# Patient Record
Sex: MALE | Race: WHITE | NOT HISPANIC OR LATINO | Employment: FULL TIME | URBAN - METROPOLITAN AREA
[De-identification: names, ages, dates, MRNs, and addresses within clinical notes are randomized per-mention and may not be internally consistent; named-entity substitution may affect disease eponyms.]

---

## 2017-12-18 ENCOUNTER — OFFICE VISIT (OUTPATIENT)
Dept: URGENT CARE | Facility: CLINIC | Age: 42
End: 2017-12-18
Payer: COMMERCIAL

## 2017-12-18 VITALS
OXYGEN SATURATION: 99 % | DIASTOLIC BLOOD PRESSURE: 88 MMHG | RESPIRATION RATE: 14 BRPM | HEART RATE: 78 BPM | TEMPERATURE: 98 F | SYSTOLIC BLOOD PRESSURE: 133 MMHG

## 2017-12-18 DIAGNOSIS — M46.1 SACROILIITIS: Primary | ICD-10-CM

## 2017-12-18 DIAGNOSIS — M62.830 BACK MUSCLE SPASM: ICD-10-CM

## 2017-12-18 PROCEDURE — 96372 THER/PROPH/DIAG INJ SC/IM: CPT | Mod: S$GLB,,, | Performed by: EMERGENCY MEDICINE

## 2017-12-18 PROCEDURE — 99203 OFFICE O/P NEW LOW 30 MIN: CPT | Mod: 25,S$GLB,, | Performed by: EMERGENCY MEDICINE

## 2017-12-18 RX ORDER — BETAMETHASONE SODIUM PHOSPHATE AND BETAMETHASONE ACETATE 3; 3 MG/ML; MG/ML
9 INJECTION, SUSPENSION INTRA-ARTICULAR; INTRALESIONAL; INTRAMUSCULAR; SOFT TISSUE
Status: COMPLETED | OUTPATIENT
Start: 2017-12-18 | End: 2017-12-18

## 2017-12-18 RX ORDER — CYCLOBENZAPRINE HCL 10 MG
10 TABLET ORAL 3 TIMES DAILY PRN
Qty: 30 TABLET | Refills: 1 | Status: SHIPPED | OUTPATIENT
Start: 2017-12-18 | End: 2017-12-28

## 2017-12-18 RX ADMIN — BETAMETHASONE SODIUM PHOSPHATE AND BETAMETHASONE ACETATE 9 MG: 3; 3 INJECTION, SUSPENSION INTRA-ARTICULAR; INTRALESIONAL; INTRAMUSCULAR; SOFT TISSUE at 11:12

## 2017-12-18 NOTE — PROGRESS NOTES
Subjective:       Patient ID: Kishore Silverio is a 42 y.o. male.    Vitals:    12/18/17 1100   BP: 133/88   Pulse: 78   Resp: 14   Temp: 97.9 °F (36.6 °C)       Chief Complaint: Back Pain (4 DAYS )    STATES HAS BEEN TRYING TO GET BACK INTO SHAPE AND RUNNING AND LIFTED SEVERAL TILE BOXES. SINCE HAS HAD RIGHT LOWER BACK PAIN AND STIFFNESS. THE ONLY THING THAT SEEMS TO BE WORKING  MG IBUPROFEN AT NIGHT WITH FLEXERIL AT NIGHT. NO RADIATION OF PAIN, NO WEAKNESS. OTHERWISE NO RASH, NO TRAUMA, NO URINARY COMPLAINTS.      Back Pain   This is a new problem. The current episode started in the past 7 days. The problem occurs constantly. The problem has been gradually worsening since onset. Pain location: LOWER lt  The quality of the pain is described as aching. The pain does not radiate. The pain is at a severity of 5/10. The pain is moderate. The pain is the same all the time. The symptoms are aggravated by lying down and bending. Stiffness is present in the morning. Pertinent negatives include no abdominal pain, chest pain, fever or headaches. He has tried heat (medrol dose pack ) for the symptoms. The treatment provided no relief.     Review of Systems   Constitution: Negative for chills and fever.   HENT: Negative for sore throat.    Eyes: Negative for blurred vision.   Cardiovascular: Negative for chest pain.   Respiratory: Negative for shortness of breath.    Skin: Negative for rash.   Musculoskeletal: Positive for back pain, muscle cramps, muscle weakness and stiffness. Negative for joint pain.   Gastrointestinal: Negative for abdominal pain, diarrhea, nausea and vomiting.   Neurological: Negative for headaches.   Psychiatric/Behavioral: The patient is not nervous/anxious.        Objective:      Physical Exam   Constitutional: He is oriented to person, place, and time. He appears well-developed and well-nourished. No distress.   HENT:   Head: Normocephalic and atraumatic.   Right Ear: External ear normal.   Left  Ear: External ear normal.   Mouth/Throat: Oropharynx is clear and moist. No oropharyngeal exudate.   Eyes: Conjunctivae and EOM are normal. Pupils are equal, round, and reactive to light.   Neck: Normal range of motion. Neck supple.   Cardiovascular: Normal rate, regular rhythm and normal heart sounds.  Exam reveals no gallop and no friction rub.    No murmur heard.  Pulmonary/Chest: Breath sounds normal. No respiratory distress. He has no wheezes. He has no rales. He exhibits no tenderness.   Abdominal: Soft. Bowel sounds are normal.   Musculoskeletal: Normal range of motion. He exhibits tenderness (TTP OVER THE RIGHT SI JOINT AREA).   Neurological: He is alert and oriented to person, place, and time. He has normal reflexes.   Skin: Skin is warm and dry. Capillary refill takes less than 2 seconds. No rash noted. He is not diaphoretic. No pallor.   Psychiatric: He has a normal mood and affect. His behavior is normal.   Nursing note and vitals reviewed.      Assessment:       1. Sacroiliitis    2. Back muscle spasm        Plan:       Kishore was seen today for back pain.    Diagnoses and all orders for this visit:    Sacroiliitis    Back muscle spasm    Other orders  -     betamethasone acetate-betamethasone sodium phosphate injection 9 mg; Inject 1.5 mLs (9 mg total) into the muscle one time.  -     cyclobenzaprine (FLEXERIL) 10 MG tablet; Take 1 tablet (10 mg total) by mouth 3 (three) times daily as needed for Muscle spasms.          Patient Instructions     CELETONE 1.5 CC GIVEN IN CLINIC  FLEXERIL RX FOR MUSCLE SPASM  SEE MUSCLE SPASM SHEET  SEE BACK PAIN SHEET    RETURN FOR ANY CONCERNS OR PROBLEMS  NO HEAVY LIFTING  RETURN FOR ANY CONCERNS OR PROBLEMS  Causes of Lumbar (Low Back) Pain  Low back pain can be caused by problems with any part of the lumbar spine. A disk can herniate (push out) and press on a nerve. Vertebrae can rub against each other or slip out of place. This can irritate facet joints and nerves. It  can also lead to stenosis, a narrowing of the spinal canal or foramen.  Pressure from a disk  Constant wear and tear on a disk can cause it to weaken and push outward. Part of the disk may then press on nearby nerves. There are two common types of herniated disks:  Contained means the soft nucleus is protruding outward.   Extruded means the firm annulus has torn, letting the soft center squeeze through.     Pressure from bone  An unstable spine   With age, a disk may thin and wear out. Vertebrae above and below the disk may begin to touch. This can put pressure on nerves. It can also cause bone spurs (growths) to form where the bones rub together.    Stenosis results when bone spurs narrow the foramen or spinal canal. This also puts pressure on nerves. Slipping vertebrae can irritate nerves and joints. They can also worsen stenosis.    In some cases, vertebrae become unstable and slip forward. This is called spondylolisthesis.     Date Last Reviewed: 10/12/2015  © 9212-1115 [employer]. [address] [place] [address]. All rights reserved. This information is not intended as a substitute for professional medical care. Always follow your healthcare professional's instructions.        Muscle Spasm  A muscle spasm (also called a cramp) is an involuntary muscle contraction. The muscle tightens quickly and strongly. A hard lump may form in the muscle. Muscle spasms are very painful. Read on to learn more about muscle spasms and how to treat and prevent them.    What causes muscles to spasm?  Often, the cause of a muscle spasm is not known. Muscle spasm is due to irritation of muscle fibers. Some things can make a muscle spasm more likely. These include:  · Injury  · Heavy exercise  · Overtired muscles  · A muscle held in one position for a long time  · Dehydration  · Low levels of certain minerals in the body  · Taking certain medications, such as diuretics or water pills  · Certain medical  conditions, such as kidney failure or diabetes  · Being pregnant  Stopping a muscle spasm  Muscle spasms often come and go quickly. When a muscle goes into spasm, very gently stretch and massage the muscle. This may help calm the muscle fibers. Then rest the muscle.  Preventing muscle spasms  Although there is little or no evidence that staying hydrated, taking certain vitamins or minerals or stretching works to prevent cramps, these measures may help and have other benefits. Talk to your health care provider about steps to take to avoid muscle spasms. These may include:  · Drinking enough fluids to avoid dehydration, especially when you exercise.  · Taking vitamin or mineral supplements.  · Getting regular exercise.  · Stretching regularly, especially before exercise.  · Limit caffeine and smoking.  · Taking a prescription muscle relaxant.  When to call your doctor  Call your doctor if you have any of the following:  · Severe cramping  · Cramping that lasts a long time, does not go away with stretching, or keeps coming back  · Pain, tingling, or weakness in the arms or legs  · Pain that wakes you up at night   Date Last Reviewed: 9/1/2015  © 3505-4795 [employer]. [address] [place] [address]. All rights reserved. This information is not intended as a substitute for professional medical care. Always follow your healthcare professional's instructions.

## 2017-12-18 NOTE — PATIENT INSTRUCTIONS
CELETONE 1.5 CC GIVEN IN CLINIC  FLEXERIL RX FOR MUSCLE SPASM  SEE MUSCLE SPASM SHEET  SEE BACK PAIN SHEET    RETURN FOR ANY CONCERNS OR PROBLEMS  NO HEAVY LIFTING  RETURN FOR ANY CONCERNS OR PROBLEMS  Causes of Lumbar (Low Back) Pain  Low back pain can be caused by problems with any part of the lumbar spine. A disk can herniate (push out) and press on a nerve. Vertebrae can rub against each other or slip out of place. This can irritate facet joints and nerves. It can also lead to stenosis, a narrowing of the spinal canal or foramen.  Pressure from a disk  Constant wear and tear on a disk can cause it to weaken and push outward. Part of the disk may then press on nearby nerves. There are two common types of herniated disks:  Contained means the soft nucleus is protruding outward.   Extruded means the firm annulus has torn, letting the soft center squeeze through.     Pressure from bone  An unstable spine   With age, a disk may thin and wear out. Vertebrae above and below the disk may begin to touch. This can put pressure on nerves. It can also cause bone spurs (growths) to form where the bones rub together.    Stenosis results when bone spurs narrow the foramen or spinal canal. This also puts pressure on nerves. Slipping vertebrae can irritate nerves and joints. They can also worsen stenosis.    In some cases, vertebrae become unstable and slip forward. This is called spondylolisthesis.     Date Last Reviewed: 10/12/2015  © 8066-1575 [employer]. [address] [place] [address], [place] [address]. All rights reserved. This information is not intended as a substitute for professional medical care. Always follow your healthcare professional's instructions.        Muscle Spasm  A muscle spasm (also called a cramp) is an involuntary muscle contraction. The muscle tightens quickly and strongly. A hard lump may form in the muscle. Muscle spasms are very painful. Read on to learn more about muscle spasms and how to  treat and prevent them.    What causes muscles to spasm?  Often, the cause of a muscle spasm is not known. Muscle spasm is due to irritation of muscle fibers. Some things can make a muscle spasm more likely. These include:  · Injury  · Heavy exercise  · Overtired muscles  · A muscle held in one position for a long time  · Dehydration  · Low levels of certain minerals in the body  · Taking certain medications, such as diuretics or water pills  · Certain medical conditions, such as kidney failure or diabetes  · Being pregnant  Stopping a muscle spasm  Muscle spasms often come and go quickly. When a muscle goes into spasm, very gently stretch and massage the muscle. This may help calm the muscle fibers. Then rest the muscle.  Preventing muscle spasms  Although there is little or no evidence that staying hydrated, taking certain vitamins or minerals or stretching works to prevent cramps, these measures may help and have other benefits. Talk to your health care provider about steps to take to avoid muscle spasms. These may include:  · Drinking enough fluids to avoid dehydration, especially when you exercise.  · Taking vitamin or mineral supplements.  · Getting regular exercise.  · Stretching regularly, especially before exercise.  · Limit caffeine and smoking.  · Taking a prescription muscle relaxant.  When to call your doctor  Call your doctor if you have any of the following:  · Severe cramping  · Cramping that lasts a long time, does not go away with stretching, or keeps coming back  · Pain, tingling, or weakness in the arms or legs  · Pain that wakes you up at night   Date Last Reviewed: 9/1/2015  © 3844-2262 [employer]. [address] [place] [address], [place] [address]. All rights reserved. This information is not intended as a substitute for professional medical care. Always follow your healthcare professional's instructions.

## 2019-01-11 ENCOUNTER — OFFICE VISIT (OUTPATIENT)
Dept: URGENT CARE | Facility: CLINIC | Age: 44
End: 2019-01-11
Payer: COMMERCIAL

## 2019-01-11 VITALS
HEIGHT: 70 IN | HEART RATE: 86 BPM | OXYGEN SATURATION: 98 % | RESPIRATION RATE: 16 BRPM | WEIGHT: 215 LBS | DIASTOLIC BLOOD PRESSURE: 91 MMHG | TEMPERATURE: 98 F | SYSTOLIC BLOOD PRESSURE: 146 MMHG | BODY MASS INDEX: 30.78 KG/M2

## 2019-01-11 DIAGNOSIS — M54.50 LOW BACK PAIN WITHOUT SCIATICA, UNSPECIFIED BACK PAIN LATERALITY, UNSPECIFIED CHRONICITY: Primary | ICD-10-CM

## 2019-01-11 PROCEDURE — 96372 PR INJECTION,THERAP/PROPH/DIAG2ST, IM OR SUBCUT: ICD-10-PCS | Mod: S$GLB,,, | Performed by: NURSE PRACTITIONER

## 2019-01-11 PROCEDURE — 96372 THER/PROPH/DIAG INJ SC/IM: CPT | Mod: S$GLB,,, | Performed by: NURSE PRACTITIONER

## 2019-01-11 PROCEDURE — 3008F BODY MASS INDEX DOCD: CPT | Mod: CPTII,S$GLB,, | Performed by: NURSE PRACTITIONER

## 2019-01-11 PROCEDURE — 99214 PR OFFICE/OUTPT VISIT, EST, LEVL IV, 30-39 MIN: ICD-10-PCS | Mod: 25,S$GLB,, | Performed by: NURSE PRACTITIONER

## 2019-01-11 PROCEDURE — 99214 OFFICE O/P EST MOD 30 MIN: CPT | Mod: 25,S$GLB,, | Performed by: NURSE PRACTITIONER

## 2019-01-11 PROCEDURE — 3008F PR BODY MASS INDEX (BMI) DOCUMENTED: ICD-10-PCS | Mod: CPTII,S$GLB,, | Performed by: NURSE PRACTITIONER

## 2019-01-11 RX ORDER — NAPROXEN 500 MG/1
500 TABLET ORAL 2 TIMES DAILY WITH MEALS
Qty: 20 TABLET | Refills: 0 | Status: SHIPPED | OUTPATIENT
Start: 2019-01-11 | End: 2019-01-21

## 2019-01-11 RX ORDER — BETAMETHASONE SODIUM PHOSPHATE AND BETAMETHASONE ACETATE 3; 3 MG/ML; MG/ML
9 INJECTION, SUSPENSION INTRA-ARTICULAR; INTRALESIONAL; INTRAMUSCULAR; SOFT TISSUE
Status: COMPLETED | OUTPATIENT
Start: 2019-01-11 | End: 2019-01-11

## 2019-01-11 RX ORDER — TRAMADOL HYDROCHLORIDE 50 MG/1
50 TABLET ORAL EVERY 8 HOURS PRN
Qty: 21 TABLET | Refills: 0 | Status: SHIPPED | OUTPATIENT
Start: 2019-01-11 | End: 2019-01-18

## 2019-01-11 RX ORDER — METHOCARBAMOL 500 MG/1
500 TABLET, FILM COATED ORAL 4 TIMES DAILY
Qty: 30 TABLET | Refills: 0 | Status: SHIPPED | OUTPATIENT
Start: 2019-01-11 | End: 2019-01-21

## 2019-01-11 RX ADMIN — BETAMETHASONE SODIUM PHOSPHATE AND BETAMETHASONE ACETATE 9 MG: 3; 3 INJECTION, SUSPENSION INTRA-ARTICULAR; INTRALESIONAL; INTRAMUSCULAR; SOFT TISSUE at 09:01

## 2019-01-11 NOTE — PATIENT INSTRUCTIONS
Please drink plenty of fluids.  Please get plenty of rest.  Please return here or go to the Emergency Department for any concerns or worsening of condition.  If you were prescribed a narcotic medication, do not drive or operate heavy equipment or machinery while taking these medications.  If you were not prescribed an anti-inflammatory medication, and if you do not have any history of stomach/intestinal ulcers, or kidney disease, or are not taking a blood thinner such as Coumadin, Plavix, Pradaxa, Eloquis, or Xaralta for example, it is OK to take over the counter Ibuprofen or Advil or Motrin or Aleve as directed.  Do not take these medications on an empty stomach.  If you lose control of your bowel and/or bladder, please go to the nearest Emergency Department immediately.  If you lose sensation in between your legs by your genitalia and/or rectum, please go to the nearest Emergency Department immediately.  If you lose control or sensation of any extremity, please go to the nearest Emergency Department immediately.  Please follow up with your primary care doctor or specialist as needed.    If you  smoke, please stop smoking.  Back Pain (Acute or Chronic)    Back pain is one of the most common problems. The good news is that most people feel better in 1 to 2 weeks, and most of the rest in 1 to 2 months. Most people can remain active.  People experience and describe pain differently; not everyone is the same.  · The pain can be sharp, stabbing, shooting, aching, cramping or burning.  · Movement, standing, bending, lifting, sitting, or walking may worsen pain.  · It can be localized to one spot or area, or it can be more generalized.  · It can spread or radiate upwards, to the front, or go down your arms or legs (sciatica).  · It can cause muscle spasm.  Most of the time, mechanical problems with the muscles or spine cause the pain. Mechanical problems are usually caused by an injury to the muscles or ligaments. While  illness can cause back pain, it is usually not caused by a serious illness. Mechanical problems include:   · Physical activity such as sports, exercise, work, or normal activity  · Overexertion, lifting, pushing, pulling incorrectly or too aggressively  · Sudden twisting, bending, or stretching from an accident, or accidental movement  · Poor posture  · Stretching or moving wrong, without noticing pain at the time  · Poor coordination, lack of regular exercise (check with your doctor about this)  · Spinal disc disease or arthritis  · Stress  Pain can also be related to pregnancy, or illness like appendicitis, bladder or kidney infections, pelvic infections, and many other things.  Acute back pain usually gets better in 1 to 2 weeks. Back pain related to disk disease, arthritis in the spinal joints or spinal stenosis (narrowing of the spinal canal) can become chronic and last for months or years.  Unless you had a physical injury (for example, a car accident or fall) X-rays are usually not needed for the initial evaluation of back pain. If pain continues and does not respond to medical treatment, X-rays and other tests may be needed.  Home care  Try these home care recommendations:  · When in bed, try to find a position of comfort. A firm mattress is best. Try lying flat on your back with pillows under your knees. You can also try lying on your side with your knees bent up towards your chest and a pillow between your knees.  · At first, do not try to stretch out the sore spots. If there is a strain, it is not like the good soreness you get after exercising without an injury. In this case, stretching may make it worse.  · Avoid prolong sitting, long car rides, or travel. This puts more stress on the lower back than standing or walking.  · During the first 24 to 72 hours after an acute injury or flare up of chronic back pain, apply an ice pack to the painful area for 20 minutes and then remove it for 20 minutes. Do  this over a period of 60 to 90 minutes or several times a day. This will reduce swelling and pain. Wrap the ice pack in a thin towel or plastic to protect your skin.  · You can start with ice, then switch to heat. Heat (hot shower, hot bath, or heating pad) reduces pain and works well for muscle spasms. Heat can be applied to the painful area for 20 minutes then remove it for 20 minutes. Do this over a period of 60 to 90 minutes or several times a day. Do not sleep on a heating pad. It can lead to skin burns or tissue damage.  · You can alternate ice and heat therapy. Talk with your doctor about the best treatment for your back pain.  · Therapeutic massage can help relax the back muscles without stretching them.  · Be aware of safe lifting methods and do not lift anything without stretching first.  Medicines  Talk to your doctor before using medicine, especially if you have other medical problems or are taking other medicines.  · You may use over-the-counter medicine as directed on the bottle to control pain, unless another pain medicine was prescribed. If you have chronic conditions like diabetes, liver or kidney disease, stomach ulcers, or gastrointestinal bleeding, or are taking blood thinners, talk to your doctor before taking any medicine.  · Be careful if you are given a prescription medicines, narcotics, or medicine for muscle spasms. They can cause drowsiness, affect your coordination, reflexes, and judgement. Do not drive or operate heavy machinery.  Follow-up care  Follow up with your healthcare provider, or as advised.   A radiologist will review any X-rays that were taken. Your provide will notify you of any new findings that may affect your care.  Call 911  Call emergency services if any of the following occur:  · Trouble breathing  · Confusion  · Very drowsy or trouble awakening  · Fainting or loss of consciousness  · Rapid or very slow heart rate  · Loss of bowel or bladder control  When to seek  medical advice  Call your healthcare provider right away if any of these occur:   · Pain becomes worse or spreads to your legs  · Weakness or numbness in one or both legs  · Numbness in the groin or genital area  Date Last Reviewed: 7/1/2016  © 0793-3408 [employer]. [address] [place] [address]. All rights reserved. This information is not intended as a substitute for professional medical care. Always follow your healthcare professional's instructions.

## 2019-07-24 ENCOUNTER — OFFICE VISIT (OUTPATIENT)
Dept: URGENT CARE | Facility: CLINIC | Age: 44
End: 2019-07-24
Payer: COMMERCIAL

## 2019-07-24 VITALS
HEART RATE: 71 BPM | BODY MASS INDEX: 30.06 KG/M2 | WEIGHT: 210 LBS | RESPIRATION RATE: 18 BRPM | SYSTOLIC BLOOD PRESSURE: 128 MMHG | TEMPERATURE: 98 F | HEIGHT: 70 IN | OXYGEN SATURATION: 99 % | DIASTOLIC BLOOD PRESSURE: 85 MMHG

## 2019-07-24 DIAGNOSIS — J40 BRONCHITIS: Primary | ICD-10-CM

## 2019-07-24 PROCEDURE — 99214 OFFICE O/P EST MOD 30 MIN: CPT | Mod: 25,S$GLB,, | Performed by: FAMILY MEDICINE

## 2019-07-24 PROCEDURE — 94640 PR INHAL RX, AIRWAY OBST/DX SPUTUM INDUCT: ICD-10-PCS | Mod: S$GLB,,, | Performed by: FAMILY MEDICINE

## 2019-07-24 PROCEDURE — 3008F PR BODY MASS INDEX (BMI) DOCUMENTED: ICD-10-PCS | Mod: CPTII,S$GLB,, | Performed by: FAMILY MEDICINE

## 2019-07-24 PROCEDURE — 99214 PR OFFICE/OUTPT VISIT, EST, LEVL IV, 30-39 MIN: ICD-10-PCS | Mod: 25,S$GLB,, | Performed by: FAMILY MEDICINE

## 2019-07-24 PROCEDURE — 3008F BODY MASS INDEX DOCD: CPT | Mod: CPTII,S$GLB,, | Performed by: FAMILY MEDICINE

## 2019-07-24 PROCEDURE — 94640 AIRWAY INHALATION TREATMENT: CPT | Mod: S$GLB,,, | Performed by: FAMILY MEDICINE

## 2019-07-24 RX ORDER — PREDNISONE 20 MG/1
40 TABLET ORAL DAILY
Qty: 6 TABLET | Refills: 0 | Status: SHIPPED | OUTPATIENT
Start: 2019-07-24 | End: 2019-07-27

## 2019-07-24 RX ORDER — BENZONATATE 200 MG/1
200 CAPSULE ORAL 3 TIMES DAILY PRN
Qty: 30 CAPSULE | Refills: 0 | Status: SHIPPED | OUTPATIENT
Start: 2019-07-24 | End: 2024-03-04 | Stop reason: ALTCHOICE

## 2019-07-24 RX ORDER — ALBUTEROL SULFATE 90 UG/1
2 AEROSOL, METERED RESPIRATORY (INHALATION) EVERY 6 HOURS PRN
Qty: 1 INHALER | Refills: 0 | Status: SHIPPED | OUTPATIENT
Start: 2019-07-24

## 2019-07-24 RX ORDER — ALBUTEROL SULFATE 0.83 MG/ML
2.5 SOLUTION RESPIRATORY (INHALATION)
Status: COMPLETED | OUTPATIENT
Start: 2019-07-24 | End: 2019-07-24

## 2019-07-24 RX ADMIN — ALBUTEROL SULFATE 2.5 MG: 0.83 SOLUTION RESPIRATORY (INHALATION) at 02:07

## 2019-07-24 NOTE — PATIENT INSTRUCTIONS
PLEASE READ YOUR DISCHARGE INSTRUCTIONS ENTIRELY AS IT CONTAINS IMPORTANT INFORMATION.      Please take your steroids to completion.   You received a steroid today - this can elevate your blood pressure, elevate your blood sugar, water weight gain, nervous energy, redness to the face and dimpling of the skin where the shot goes in if you had an injection.   Do not use steroids more than 3 times per year.   If you have diabetes, please check you blood sugar frequently.  If you have high blood pressure, please check your blood pressure frequently.     Use the albuterol inhaler for wheezing.     Tessalon for cough    Please return or see your primary care doctor if you develop new or worsening symptoms.     Please arrange follow up with your primary medical clinic as soon as possible. You must understand that you've received an Urgent Care treatment only and that you may be released before all of your medical problems are known or treated. You, the patient, will arrange for follow up as instructed. If your symptoms worsen or fail to improve you should go to the Emergency Room.    Bronchitis with Wheezing (Viral or Bacterial: Adult)    Bronchitis is an infection of the air passages. It often occurs during a cold and is usually caused by a virus. Symptoms include cough with mucus (phlegm) and low-grade fever. This illness is contagious during the first few days and is spread through the air by coughing and sneezing, or by direct contact (touching the sick person and then touching your own eyes, nose, or mouth).  If there is a lot of inflammation, air flow is restricted. The air passages may also go into spasm, especially if you have asthma. This causes wheezing and difficulty breathing even in people who do not have asthma.  Bronchitis usually lasts 7 to 14 days. The wheezing should improve with treatment during the first week. An inhaler is often prescribed to relax the air passages and stop wheezing. Antibiotics will  be prescribed if your doctor thinks there is also a secondary bacterial infection.  Home care  · If symptoms are severe, rest at home for the first 2 to 3 days. When you go back to your usual activities, don't let yourself get too tired.  · Do not smoke. Also avoid being exposed to secondhand smoke.  · You may use over-the-counter medicine to control fever or pain, unless another medicine was prescribed. Note: If you have chronic liver or kidney disease or have ever had a stomach ulcer or gastrointestinal bleeding, talk with your healthcare provider before using these medicines. Also talk to your provider if you are taking medicine to prevent blood clots.) Aspirin should never be given to anyone younger than 18 years of age who is ill with a viral infection or fever. It may cause severe liver or brain damage.  · Your appetite may be poor, so a light diet is fine. Avoid dehydration by drinking 6 to 8 glasses of fluids per day (such as water, soft drinks, sports drinks, juices, tea, or soup). Extra fluids will help loosen secretions in the nose and lungs.  · Over-the-counter cough, cold, and sore-throat medicines will not shorten the length of the illness, but they may be helpful to reduce symptoms. (Note: Do not use decongestants if you have high blood pressure.)  · If you were given an inhaler, use it exactly as directed. If you need to use it more often than prescribed, your condition may be worsening. If this happens, contact your healthcare provider.  · If prescribed, finish all antibiotic medicine, even if you are feeling better after only a few days.  Follow-up care  Follow up with your healthcare provider, or as advised. If you had an X-ray or ECG (electrocardiogram), a specialist will review it. You will be notified of any new findings that may affect your care.  Note: If you are age 65 or older, or if you have a chronic lung disease or condition that affects your immune system, or you smoke, talk to your  healthcare provider about having a pneumococcal vaccinations and a yearly influenza vaccination (flu shot).  When to seek medical advice  Call your healthcare provider right away if any of these occur:  · Fever of 100.4°F (38°C) or higher  · Coughing up increasing amounts of colored sputum  · Weakness, drowsiness, headache, facial pain, ear pain, or a stiff neck  Call 911, or get immediate medical care  Contact emergency services right away if any of these occur.  · Coughing up blood  · Worsening weakness, drowsiness, headache, or stiff neck  · Increased wheezing not helped with medication, shortness of breath, or pain with breathing  Date Last Reviewed: 9/13/2015  © 5787-9858 [employer]. [address] [place] [address], [place] [address]. All rights reserved. This information is not intended as a substitute for professional medical care. Always follow your healthcare professional's instructions.

## 2019-07-24 NOTE — PROGRESS NOTES
"Subjective:       Patient ID: Kishore Silverio is a 43 y.o. male.    Vitals:    07/24/19 1402 07/24/19 1422   BP: 128/85    Pulse: 80 71   Resp: 18    Temp: 98 °F (36.7 °C)    SpO2: 99% 99%   Weight: 95.3 kg (210 lb)    Height: 5' 10" (1.778 m)        Chief Complaint: Cough (2 weeks now )    Patient states cough for 2 weeks.  No fever currently but did have fever at the onset of symptoms.  Does feel short of breath and sometimes hear wheezing.  No history of bronchitis or pneumonia.  Has been taking Mucinex DM    Cough   This is a new problem. The current episode started 1 to 4 weeks ago. The problem has been gradually worsening. The problem occurs every few minutes. The cough is productive of sputum (clear ). Associated symptoms include shortness of breath and wheezing. Pertinent negatives include no chest pain, chills, ear pain, eye redness, fever, headaches, myalgias or sore throat. Nothing aggravates the symptoms. He has tried OTC cough suppressant for the symptoms. The treatment provided mild relief. There is no history of asthma, bronchitis or pneumonia.     Review of Systems   Constitution: Positive for decreased appetite. Negative for chills, fever and malaise/fatigue.   HENT: Negative for congestion, ear pain, hoarse voice and sore throat.    Eyes: Negative for discharge and redness.   Cardiovascular: Negative for chest pain, dyspnea on exertion and leg swelling.   Respiratory: Positive for cough, shortness of breath and wheezing. Negative for sputum production.    Musculoskeletal: Negative for myalgias.   Gastrointestinal: Negative for abdominal pain and nausea.   Neurological: Negative for headaches.       Objective:      Physical Exam   Constitutional: He is oriented to person, place, and time. He appears well-developed and well-nourished. He is cooperative.  Non-toxic appearance. He does not appear ill. No distress.   HENT:   Head: Normocephalic and atraumatic.   Right Ear: Hearing, tympanic membrane, " external ear and ear canal normal.   Left Ear: Hearing, tympanic membrane, external ear and ear canal normal.   Nose: Nose normal. No mucosal edema, rhinorrhea or nasal deformity. No epistaxis. Right sinus exhibits no maxillary sinus tenderness and no frontal sinus tenderness. Left sinus exhibits no maxillary sinus tenderness and no frontal sinus tenderness.   Mouth/Throat: Uvula is midline, oropharynx is clear and moist and mucous membranes are normal. No trismus in the jaw. Normal dentition. No uvula swelling. No oropharyngeal exudate or posterior oropharyngeal erythema. Tonsils are 1+ on the right. Tonsils are 1+ on the left. No tonsillar exudate.   Eyes: Conjunctivae and lids are normal. No scleral icterus.   Sclera clear bilat   Neck: Trachea normal, full passive range of motion without pain and phonation normal. Neck supple.   Cardiovascular: Normal rate, regular rhythm, normal heart sounds, intact distal pulses and normal pulses.   Pulmonary/Chest: Effort normal. No accessory muscle usage. No tachypnea. No respiratory distress. He has wheezes in the right upper field, the right middle field, the right lower field, the left upper field, the left middle field and the left lower field.   Post treatment assessment: subjective improvement in symptoms - improvement in wheezing     Abdominal: Soft. Normal appearance and bowel sounds are normal. He exhibits no distension. There is no tenderness.   Musculoskeletal: Normal range of motion. He exhibits no edema or deformity.   Neurological: He is alert and oriented to person, place, and time. He exhibits normal muscle tone. Coordination normal.   Skin: Skin is warm, dry and intact. He is not diaphoretic. No pallor.   Psychiatric: He has a normal mood and affect. His speech is normal and behavior is normal. Judgment and thought content normal. Cognition and memory are normal.   Nursing note and vitals reviewed.      Assessment:       1. Bronchitis        Plan:       Kishore  was seen today for cough.    Diagnoses and all orders for this visit:    Bronchitis  -     albuterol (PROVENTIL/VENTOLIN HFA) 90 mcg/actuation inhaler; Inhale 2 puffs into the lungs every 6 (six) hours as needed for Wheezing. Rescue  -     predniSONE (DELTASONE) 20 MG tablet; Take 2 tablets (40 mg total) by mouth once daily. for 3 days  -     benzonatate (TESSALON) 200 MG capsule; Take 1 capsule (200 mg total) by mouth 3 (three) times daily as needed for Cough.  -     albuterol nebulizer solution 2.5 mg          Patient Instructions   PLEASE READ YOUR DISCHARGE INSTRUCTIONS ENTIRELY AS IT CONTAINS IMPORTANT INFORMATION.      Please take your steroids to completion.   You received a steroid today - this can elevate your blood pressure, elevate your blood sugar, water weight gain, nervous energy, redness to the face and dimpling of the skin where the shot goes in if you had an injection.   Do not use steroids more than 3 times per year.   If you have diabetes, please check you blood sugar frequently.  If you have high blood pressure, please check your blood pressure frequently.     Use the albuterol inhaler for wheezing.     Tessalon for cough    Please return or see your primary care doctor if you develop new or worsening symptoms.     Please arrange follow up with your primary medical clinic as soon as possible. You must understand that you've received an Urgent Care treatment only and that you may be released before all of your medical problems are known or treated. You, the patient, will arrange for follow up as instructed. If your symptoms worsen or fail to improve you should go to the Emergency Room.    Bronchitis with Wheezing (Viral or Bacterial: Adult)    Bronchitis is an infection of the air passages. It often occurs during a cold and is usually caused by a virus. Symptoms include cough with mucus (phlegm) and low-grade fever. This illness is contagious during the first few days and is spread through the air  by coughing and sneezing, or by direct contact (touching the sick person and then touching your own eyes, nose, or mouth).  If there is a lot of inflammation, air flow is restricted. The air passages may also go into spasm, especially if you have asthma. This causes wheezing and difficulty breathing even in people who do not have asthma.  Bronchitis usually lasts 7 to 14 days. The wheezing should improve with treatment during the first week. An inhaler is often prescribed to relax the air passages and stop wheezing. Antibiotics will be prescribed if your doctor thinks there is also a secondary bacterial infection.  Home care  · If symptoms are severe, rest at home for the first 2 to 3 days. When you go back to your usual activities, don't let yourself get too tired.  · Do not smoke. Also avoid being exposed to secondhand smoke.  · You may use over-the-counter medicine to control fever or pain, unless another medicine was prescribed. Note: If you have chronic liver or kidney disease or have ever had a stomach ulcer or gastrointestinal bleeding, talk with your healthcare provider before using these medicines. Also talk to your provider if you are taking medicine to prevent blood clots.) Aspirin should never be given to anyone younger than 18 years of age who is ill with a viral infection or fever. It may cause severe liver or brain damage.  · Your appetite may be poor, so a light diet is fine. Avoid dehydration by drinking 6 to 8 glasses of fluids per day (such as water, soft drinks, sports drinks, juices, tea, or soup). Extra fluids will help loosen secretions in the nose and lungs.  · Over-the-counter cough, cold, and sore-throat medicines will not shorten the length of the illness, but they may be helpful to reduce symptoms. (Note: Do not use decongestants if you have high blood pressure.)  · If you were given an inhaler, use it exactly as directed. If you need to use it more often than prescribed, your condition  may be worsening. If this happens, contact your healthcare provider.  · If prescribed, finish all antibiotic medicine, even if you are feeling better after only a few days.  Follow-up care  Follow up with your healthcare provider, or as advised. If you had an X-ray or ECG (electrocardiogram), a specialist will review it. You will be notified of any new findings that may affect your care.  Note: If you are age 65 or older, or if you have a chronic lung disease or condition that affects your immune system, or you smoke, talk to your healthcare provider about having a pneumococcal vaccinations and a yearly influenza vaccination (flu shot).  When to seek medical advice  Call your healthcare provider right away if any of these occur:  · Fever of 100.4°F (38°C) or higher  · Coughing up increasing amounts of colored sputum  · Weakness, drowsiness, headache, facial pain, ear pain, or a stiff neck  Call 911, or get immediate medical care  Contact emergency services right away if any of these occur.  · Coughing up blood  · Worsening weakness, drowsiness, headache, or stiff neck  · Increased wheezing not helped with medication, shortness of breath, or pain with breathing  Date Last Reviewed: 9/13/2015  © 3285-3057 [employer]. [address] [address], [place] [address]. All rights reserved. This information is not intended as a substitute for professional medical care. Always follow your healthcare professional's instructions.

## 2019-07-28 ENCOUNTER — OFFICE VISIT (OUTPATIENT)
Dept: URGENT CARE | Facility: CLINIC | Age: 44
End: 2019-07-28
Payer: COMMERCIAL

## 2019-07-28 VITALS
TEMPERATURE: 97 F | HEIGHT: 70 IN | RESPIRATION RATE: 16 BRPM | OXYGEN SATURATION: 96 % | BODY MASS INDEX: 30.06 KG/M2 | HEART RATE: 93 BPM | SYSTOLIC BLOOD PRESSURE: 134 MMHG | DIASTOLIC BLOOD PRESSURE: 95 MMHG | WEIGHT: 210 LBS

## 2019-07-28 DIAGNOSIS — B96.89 ACUTE BACTERIAL BRONCHITIS: ICD-10-CM

## 2019-07-28 DIAGNOSIS — R05.9 COUGH: Primary | ICD-10-CM

## 2019-07-28 DIAGNOSIS — J20.8 ACUTE BACTERIAL BRONCHITIS: ICD-10-CM

## 2019-07-28 PROCEDURE — 94640 AIRWAY INHALATION TREATMENT: CPT | Mod: S$GLB,,, | Performed by: FAMILY MEDICINE

## 2019-07-28 PROCEDURE — 99213 OFFICE O/P EST LOW 20 MIN: CPT | Mod: 25,S$GLB,, | Performed by: FAMILY MEDICINE

## 2019-07-28 PROCEDURE — 3008F BODY MASS INDEX DOCD: CPT | Mod: CPTII,S$GLB,, | Performed by: FAMILY MEDICINE

## 2019-07-28 PROCEDURE — 99213 PR OFFICE/OUTPT VISIT, EST, LEVL III, 20-29 MIN: ICD-10-PCS | Mod: 25,S$GLB,, | Performed by: FAMILY MEDICINE

## 2019-07-28 PROCEDURE — 71046 X-RAY EXAM CHEST 2 VIEWS: CPT | Mod: S$GLB,,, | Performed by: RADIOLOGY

## 2019-07-28 PROCEDURE — 71046 XR CHEST PA AND LATERAL: ICD-10-PCS | Mod: S$GLB,,, | Performed by: RADIOLOGY

## 2019-07-28 PROCEDURE — 3008F PR BODY MASS INDEX (BMI) DOCUMENTED: ICD-10-PCS | Mod: CPTII,S$GLB,, | Performed by: FAMILY MEDICINE

## 2019-07-28 PROCEDURE — 94640 PR INHAL RX, AIRWAY OBST/DX SPUTUM INDUCT: ICD-10-PCS | Mod: S$GLB,,, | Performed by: FAMILY MEDICINE

## 2019-07-28 RX ORDER — DOXYCYCLINE 100 MG/1
100 CAPSULE ORAL EVERY 12 HOURS
Qty: 14 CAPSULE | Refills: 0 | Status: SHIPPED | OUTPATIENT
Start: 2019-07-28 | End: 2019-08-04

## 2019-07-28 RX ORDER — IPRATROPIUM BROMIDE 0.5 MG/2.5ML
0.5 SOLUTION RESPIRATORY (INHALATION)
Status: COMPLETED | OUTPATIENT
Start: 2019-07-28 | End: 2019-07-28

## 2019-07-28 RX ORDER — PROMETHAZINE HYDROCHLORIDE AND DEXTROMETHORPHAN HYDROBROMIDE 6.25; 15 MG/5ML; MG/5ML
5 SYRUP ORAL NIGHTLY PRN
Qty: 118 ML | Refills: 0 | Status: SHIPPED | OUTPATIENT
Start: 2019-07-28 | End: 2019-07-28 | Stop reason: SDUPTHER

## 2019-07-28 RX ORDER — PROMETHAZINE HYDROCHLORIDE AND DEXTROMETHORPHAN HYDROBROMIDE 6.25; 15 MG/5ML; MG/5ML
5 SYRUP ORAL NIGHTLY PRN
Qty: 118 ML | Refills: 0 | Status: SHIPPED | OUTPATIENT
Start: 2019-07-28 | End: 2019-09-19 | Stop reason: SDUPTHER

## 2019-07-28 RX ORDER — DOXYCYCLINE 100 MG/1
100 CAPSULE ORAL EVERY 12 HOURS
Qty: 14 CAPSULE | Refills: 0 | Status: SHIPPED | OUTPATIENT
Start: 2019-07-28 | End: 2019-07-28 | Stop reason: SDUPTHER

## 2019-07-28 RX ORDER — ALBUTEROL SULFATE 0.83 MG/ML
2.5 SOLUTION RESPIRATORY (INHALATION)
Status: COMPLETED | OUTPATIENT
Start: 2019-07-28 | End: 2019-07-28

## 2019-07-28 RX ADMIN — ALBUTEROL SULFATE 2.5 MG: 0.83 SOLUTION RESPIRATORY (INHALATION) at 02:07

## 2019-07-28 RX ADMIN — IPRATROPIUM BROMIDE 0.5 MG: 0.5 SOLUTION RESPIRATORY (INHALATION) at 02:07

## 2019-07-28 NOTE — PROGRESS NOTES
"Subjective:       Patient ID: Kishore Silverio is a 43 y.o. male.    Vitals:    07/28/19 1342   BP: (!) 134/95   Pulse: 93   Resp: 16   Temp: 97.2 °F (36.2 °C)   SpO2: 96%   Weight: 95.3 kg (210 lb)   Height: 5' 10" (1.778 m)       Chief Complaint: Cough    Pt states he was seen here 4 days ago. Pt states he continues to cough and cough is keeping him up at night. No fever. Does feel sob, not worse than previous visit. Took prednisone 40mg BID x3 days, tessalon perles and continues albuterol. He reports slight improvement while taking the prednisone but no significant improvement. Not significantly worse. No sinus tenderness or congestion.    Cough   This is a new problem. The current episode started 1 to 4 weeks ago. The problem has been unchanged. The cough is productive of sputum. Associated symptoms include chills and shortness of breath. Pertinent negatives include no chest pain, fever, headaches, rash or sore throat. Nothing aggravates the symptoms. Treatments tried: tessalon perls, oral steroid. The treatment provided mild relief.     Review of Systems   Constitution: Positive for chills and decreased appetite. Negative for fever.   HENT: Positive for congestion. Negative for sore throat.    Eyes: Negative for blurred vision.   Cardiovascular: Negative for chest pain.   Respiratory: Positive for cough, shortness of breath and sputum production.    Skin: Negative for rash.   Musculoskeletal: Negative for back pain and joint pain.   Gastrointestinal: Positive for nausea. Negative for abdominal pain, diarrhea and vomiting.   Neurological: Negative for headaches.   Psychiatric/Behavioral: The patient is not nervous/anxious.        Objective:      Physical Exam   Constitutional: He is oriented to person, place, and time. He appears well-developed and well-nourished. He is cooperative.  Non-toxic appearance. He does not appear ill. No distress.   HENT:   Head: Normocephalic and atraumatic.   Right Ear: Hearing, " tympanic membrane, external ear and ear canal normal. No middle ear effusion.   Left Ear: Hearing, tympanic membrane, external ear and ear canal normal.  No middle ear effusion.   Nose: Nose normal. No mucosal edema, rhinorrhea or nasal deformity. No epistaxis. Right sinus exhibits no maxillary sinus tenderness and no frontal sinus tenderness. Left sinus exhibits no maxillary sinus tenderness and no frontal sinus tenderness.   Mouth/Throat: Uvula is midline, oropharynx is clear and moist and mucous membranes are normal. No trismus in the jaw. Normal dentition. No uvula swelling. No oropharyngeal exudate or posterior oropharyngeal erythema.   Eyes: Conjunctivae and lids are normal. No scleral icterus.   Sclera clear bilat   Neck: Trachea normal, full passive range of motion without pain and phonation normal. Neck supple.   Cardiovascular: Normal rate, regular rhythm, normal heart sounds, intact distal pulses and normal pulses.   Pulmonary/Chest: Effort normal and breath sounds normal. No accessory muscle usage. No tachypnea. No respiratory distress. He has no decreased breath sounds. He has no wheezes. He has no rhonchi. He has no rales.   Wheezing actually improved from last visit  NAD able to speak in clear complete sentences   Abdominal: Soft. Normal appearance and bowel sounds are normal. He exhibits no distension. There is no tenderness.   Musculoskeletal: Normal range of motion. He exhibits no edema or deformity.   Neurological: He is alert and oriented to person, place, and time. He exhibits normal muscle tone. Coordination normal.   Skin: Skin is warm, dry and intact. He is not diaphoretic. No pallor.   Psychiatric: He has a normal mood and affect. His speech is normal and behavior is normal. Judgment and thought content normal. Cognition and memory are normal.   Nursing note and vitals reviewed.    X-ray Chest Pa And Lateral    Result Date: 7/28/2019  EXAMINATION: XR CHEST PA AND LATERAL CLINICAL HISTORY:  Cough TECHNIQUE: PA and lateral views of the chest were performed. COMPARISON: None FINDINGS: The cardiomediastinal silhouette is not enlarged.  There is no pleural effusion.  The trachea is midline.  The lungs are symmetrically expanded bilaterally with bandlike atelectasis projected over the left lower lung zone..  No large focal consolidation seen.  There is no pneumothorax.  The osseous structures are unremarkable.     1. Bandlike atelectasis projected over the left lower lung zone, no convincing large focal consolidation. Electronically signed by: Jeremy Bennett MD Date:    07/28/2019 Time:    14:11    Assessment:       1. Cough    2. Acute bacterial bronchitis        Plan:       Kishore was seen today for cough.    Diagnoses and all orders for this visit:    Cough  -     X-Ray Chest PA And Lateral; Future  -     albuterol nebulizer solution 2.5 mg  -     ipratropium 0.02 % nebulizer solution 0.5 mg    Acute bacterial bronchitis  -     doxycycline (MONODOX) 100 MG capsule; Take 1 capsule (100 mg total) by mouth every 12 (twelve) hours. for 7 days  -     budesonide 180mcg (PULMICORT 180MCG) 180 mcg/actuation AePB; Inhale 2 puffs into the lungs once daily. Controller for 10 days  -     promethazine-dextromethorphan (PROMETHAZINE-DM) 6.25-15 mg/5 mL Syrp; Take 5 mLs by mouth nightly as needed. 5 mL every 4 to 6 hours; maximum: 30 mL in 24 hours    will try abx, flovent inhaler daily with albuterol throughout the day. Cough syrup for nighttime.     Patient Instructions   PLEASE READ YOUR DISCHARGE INSTRUCTIONS ENTIRELY AS IT CONTAINS IMPORTANT INFORMATION.    Mucinex during the day    Taking the antibiotics to completion    Flovent inhaler daily for 10 days    albuterol use throughout the day as needed for cough wheezing chest tightness    Do not drive while taking the cough syrup - best to take it at night before going to sleep. do not drive or operate machinery. This medication will make you drowsy. Try taking half  a dose first to see how it affects you.     Please return or see your primary care doctor if you develop new or worsening symptoms.     You must understand that you have received an Urgent Care treatment only and that you may be released before all of your medical problems are known or treated.    Bronchitis, Antibiotic Treatment (Adult)    Bronchitis is an infection of the air passages (bronchial tubes) in your lungs. It often occurs when you have a cold. This illness is contagious during the first few days and is spread through the air by coughing and sneezing, or by direct contact (touching the sick person and then touching your own eyes, nose, or mouth).  Symptoms of bronchitis include cough with mucus (phlegm) and low-grade fever. Bronchitis usually lasts 7 to 14 days. Mild cases can be treated with simple home remedies. More severe infection is treated with an antibiotic.  Home care  Follow these guidelines when caring for yourself at home:  · If your symptoms are severe, rest at home for the first 2 to 3 days. When you go back to your usual activities, don't let yourself get too tired.  · Do not smoke. Also avoid being exposed to secondhand smoke.  · You may use over-the-counter medicines to control fever or pain, unless another medicine was prescribed. (Note: If you have chronic liver or kidney disease or have ever had a stomach ulcer or gastrointestinal bleeding, talk with your healthcare provider before using these medicines. Also talk to your provider if you are taking medicine to prevent blood clots.) Aspirin should never be given to anyone younger than 18 years of age who is ill with a viral infection or fever. It may cause severe liver or brain damage.  · Your appetite may be poor, so a light diet is fine. Avoid dehydration by drinking 6 to 8 glasses of fluids per day (such as water, soft drinks, sports drinks, juices, tea, or soup). Extra fluids will help loosen secretions in the nose and  lungs.  · Over-the-counter cough, cold, and sore-throat medicines will not shorten the length of the illness, but they may be helpful to reduce symptoms. (Note: Do not use decongestants if you have high blood pressure.)  · Finish all antibiotic medicine. Do this even if you are feeling better after only a few days.  Follow-up care  Follow up with your healthcare provider, or as advised. If you had an X-ray or ECG (electrocardiogram), a specialist will review it. You will be notified of any new findings that may affect your care.  Note: If you are age 65 or older, or if you have a chronic lung disease or condition that affects your immune system, or you smoke, talk to your healthcare provider about having pneumococcal vaccinations and a yearly influenza vaccination (flu shot).  When to seek medical advice  Call your healthcare provider right away if any of these occur:  · Fever of 100.4°F (38°C) or higher  · Coughing up increased amounts of colored sputum  · Weakness, drowsiness, headache, facial pain, ear pain, or a stiff neck  Call 911, or get immediate medical care  Contact emergency services right away if any of these occur.  · Coughing up blood  · Worsening weakness, drowsiness, headache, or stiff neck  · Trouble breathing, wheezing, or pain with breathing  Date Last Reviewed: 9/13/2015  © 8774-3319 [employer]. [address] [place] [address], [place] [address]. All rights reserved. This information is not intended as a substitute for professional medical care. Always follow your healthcare professional's instructions.

## 2019-07-28 NOTE — PATIENT INSTRUCTIONS
PLEASE READ YOUR DISCHARGE INSTRUCTIONS ENTIRELY AS IT CONTAINS IMPORTANT INFORMATION.    Mucinex during the day    Taking the antibiotics to completion    Flovent inhaler daily for 10 days    albuterol use throughout the day as needed for cough wheezing chest tightness    Do not drive while taking the cough syrup - best to take it at night before going to sleep. do not drive or operate machinery. This medication will make you drowsy. Try taking half a dose first to see how it affects you.     Please return or see your primary care doctor if you develop new or worsening symptoms.     You must understand that you have received an Urgent Care treatment only and that you may be released before all of your medical problems are known or treated.    Bronchitis, Antibiotic Treatment (Adult)    Bronchitis is an infection of the air passages (bronchial tubes) in your lungs. It often occurs when you have a cold. This illness is contagious during the first few days and is spread through the air by coughing and sneezing, or by direct contact (touching the sick person and then touching your own eyes, nose, or mouth).  Symptoms of bronchitis include cough with mucus (phlegm) and low-grade fever. Bronchitis usually lasts 7 to 14 days. Mild cases can be treated with simple home remedies. More severe infection is treated with an antibiotic.  Home care  Follow these guidelines when caring for yourself at home:  · If your symptoms are severe, rest at home for the first 2 to 3 days. When you go back to your usual activities, don't let yourself get too tired.  · Do not smoke. Also avoid being exposed to secondhand smoke.  · You may use over-the-counter medicines to control fever or pain, unless another medicine was prescribed. (Note: If you have chronic liver or kidney disease or have ever had a stomach ulcer or gastrointestinal bleeding, talk with your healthcare provider before using these medicines. Also talk to your provider if you  are taking medicine to prevent blood clots.) Aspirin should never be given to anyone younger than 18 years of age who is ill with a viral infection or fever. It may cause severe liver or brain damage.  · Your appetite may be poor, so a light diet is fine. Avoid dehydration by drinking 6 to 8 glasses of fluids per day (such as water, soft drinks, sports drinks, juices, tea, or soup). Extra fluids will help loosen secretions in the nose and lungs.  · Over-the-counter cough, cold, and sore-throat medicines will not shorten the length of the illness, but they may be helpful to reduce symptoms. (Note: Do not use decongestants if you have high blood pressure.)  · Finish all antibiotic medicine. Do this even if you are feeling better after only a few days.  Follow-up care  Follow up with your healthcare provider, or as advised. If you had an X-ray or ECG (electrocardiogram), a specialist will review it. You will be notified of any new findings that may affect your care.  Note: If you are age 65 or older, or if you have a chronic lung disease or condition that affects your immune system, or you smoke, talk to your healthcare provider about having pneumococcal vaccinations and a yearly influenza vaccination (flu shot).  When to seek medical advice  Call your healthcare provider right away if any of these occur:  · Fever of 100.4°F (38°C) or higher  · Coughing up increased amounts of colored sputum  · Weakness, drowsiness, headache, facial pain, ear pain, or a stiff neck  Call 911, or get immediate medical care  Contact emergency services right away if any of these occur.  · Coughing up blood  · Worsening weakness, drowsiness, headache, or stiff neck  · Trouble breathing, wheezing, or pain with breathing  Date Last Reviewed: 9/13/2015  © 8847-3890 [employer]. [address] [place] [address], [place] [address]. All rights reserved. This information is not intended as a substitute for professional medical care. Always  follow your healthcare professional's instructions.

## 2019-09-19 ENCOUNTER — OFFICE VISIT (OUTPATIENT)
Dept: URGENT CARE | Facility: CLINIC | Age: 44
End: 2019-09-19
Payer: COMMERCIAL

## 2019-09-19 VITALS
HEART RATE: 80 BPM | OXYGEN SATURATION: 97 % | BODY MASS INDEX: 30.06 KG/M2 | DIASTOLIC BLOOD PRESSURE: 90 MMHG | HEIGHT: 70 IN | SYSTOLIC BLOOD PRESSURE: 130 MMHG | WEIGHT: 210 LBS | TEMPERATURE: 98 F | RESPIRATION RATE: 16 BRPM

## 2019-09-19 DIAGNOSIS — B96.89 ACUTE BACTERIAL BRONCHITIS: Primary | ICD-10-CM

## 2019-09-19 DIAGNOSIS — J20.8 ACUTE BACTERIAL BRONCHITIS: Primary | ICD-10-CM

## 2019-09-19 PROCEDURE — 99214 PR OFFICE/OUTPT VISIT, EST, LEVL IV, 30-39 MIN: ICD-10-PCS | Mod: 25,S$GLB,, | Performed by: PHYSICIAN ASSISTANT

## 2019-09-19 PROCEDURE — 3008F PR BODY MASS INDEX (BMI) DOCUMENTED: ICD-10-PCS | Mod: CPTII,S$GLB,, | Performed by: PHYSICIAN ASSISTANT

## 2019-09-19 PROCEDURE — 3008F BODY MASS INDEX DOCD: CPT | Mod: CPTII,S$GLB,, | Performed by: PHYSICIAN ASSISTANT

## 2019-09-19 PROCEDURE — 96372 THER/PROPH/DIAG INJ SC/IM: CPT | Mod: S$GLB,,, | Performed by: PHYSICIAN ASSISTANT

## 2019-09-19 PROCEDURE — 99214 OFFICE O/P EST MOD 30 MIN: CPT | Mod: 25,S$GLB,, | Performed by: PHYSICIAN ASSISTANT

## 2019-09-19 PROCEDURE — 96372 PR INJECTION,THERAP/PROPH/DIAG2ST, IM OR SUBCUT: ICD-10-PCS | Mod: S$GLB,,, | Performed by: PHYSICIAN ASSISTANT

## 2019-09-19 RX ORDER — PROMETHAZINE HYDROCHLORIDE AND DEXTROMETHORPHAN HYDROBROMIDE 6.25; 15 MG/5ML; MG/5ML
5 SYRUP ORAL NIGHTLY PRN
Qty: 118 ML | Refills: 0 | Status: SHIPPED | OUTPATIENT
Start: 2019-09-19

## 2019-09-19 RX ORDER — BETAMETHASONE SODIUM PHOSPHATE AND BETAMETHASONE ACETATE 3; 3 MG/ML; MG/ML
6 INJECTION, SUSPENSION INTRA-ARTICULAR; INTRALESIONAL; INTRAMUSCULAR; SOFT TISSUE
Status: COMPLETED | OUTPATIENT
Start: 2019-09-19 | End: 2019-09-19

## 2019-09-19 RX ORDER — DOXYCYCLINE HYCLATE 100 MG
100 TABLET ORAL 2 TIMES DAILY
Qty: 14 TABLET | Refills: 0 | Status: SHIPPED | OUTPATIENT
Start: 2019-09-19 | End: 2019-09-26

## 2019-09-19 RX ADMIN — BETAMETHASONE SODIUM PHOSPHATE AND BETAMETHASONE ACETATE 6 MG: 3; 3 INJECTION, SUSPENSION INTRA-ARTICULAR; INTRALESIONAL; INTRAMUSCULAR; SOFT TISSUE at 03:09

## 2019-09-19 NOTE — PATIENT INSTRUCTIONS
Please go to the emergency department if your symptoms worsen or change.  Please follow-up with your primary care provider within week to assess resolution of your cough and symptoms.        Bronchitis with Wheezing (Viral or Bacterial: Adult)    Bronchitis is an infection of the air passages. It often occurs during a cold and is usually caused by a virus. Symptoms include cough with mucus (phlegm) and low-grade fever. This illness is contagious during the first few days and is spread through the air by coughing and sneezing, or by direct contact (touching the sick person and then touching your own eyes, nose, or mouth).  If there is a lot of inflammation, air flow is restricted. The air passages may also go into spasm, especially if you have asthma. This causes wheezing and difficulty breathing even in people who do not have asthma.  Bronchitis usually lasts 7 to 14 days. The wheezing should improve with treatment during the first week. An inhaler is often prescribed to relax the air passages and stop wheezing. Antibiotics will be prescribed if your doctor thinks there is also a secondary bacterial infection.  Home care  · If symptoms are severe, rest at home for the first 2 to 3 days. When you go back to your usual activities, don't let yourself get too tired.  · Do not smoke. Also avoid being exposed to secondhand smoke.  · You may use over-the-counter medicine to control fever or pain, unless another medicine was prescribed. Note: If you have chronic liver or kidney disease or have ever had a stomach ulcer or gastrointestinal bleeding, talk with your healthcare provider before using these medicines. Also talk to your provider if you are taking medicine to prevent blood clots.) Aspirin should never be given to anyone younger than 18 years of age who is ill with a viral infection or fever. It may cause severe liver or brain damage.  · Your appetite may be poor, so a light diet is fine. Avoid dehydration by  drinking 6 to 8 glasses of fluids per day (such as water, soft drinks, sports drinks, juices, tea, or soup). Extra fluids will help loosen secretions in the nose and lungs.  · Over-the-counter cough, cold, and sore-throat medicines will not shorten the length of the illness, but they may be helpful to reduce symptoms. (Note: Do not use decongestants if you have high blood pressure.)  · If you were given an inhaler, use it exactly as directed. If you need to use it more often than prescribed, your condition may be worsening. If this happens, contact your healthcare provider.  · If prescribed, finish all antibiotic medicine, even if you are feeling better after only a few days.  Follow-up care  Follow up with your healthcare provider, or as advised. If you had an X-ray or ECG (electrocardiogram), a specialist will review it. You will be notified of any new findings that may affect your care.  Note: If you are age 65 or older, or if you have a chronic lung disease or condition that affects your immune system, or you smoke, talk to your healthcare provider about having a pneumococcal vaccinations and a yearly influenza vaccination (flu shot).  When to seek medical advice  Call your healthcare provider right away if any of these occur:  · Fever of 100.4°F (38°C) or higher  · Coughing up increasing amounts of colored sputum  · Weakness, drowsiness, headache, facial pain, ear pain, or a stiff neck  Call 911, or get immediate medical care  Contact emergency services right away if any of these occur.  · Coughing up blood  · Worsening weakness, drowsiness, headache, or stiff neck  · Increased wheezing not helped with medication, shortness of breath, or pain with breathing  Date Last Reviewed: 9/13/2015 © 2000-2017 [employer]. [address] [place] [address], [place] [address]. All rights reserved. This information is not intended as a substitute for professional medical care. Always follow your healthcare  professional's instructions.

## 2019-09-19 NOTE — PROGRESS NOTES
"Subjective:       Patient ID: Kishore Silverio is a 43 y.o. male.    Vitals:    09/19/19 1521   BP: (!) 130/90   Pulse: 80   Resp: 16   Temp: 97.6 °F (36.4 °C)   TempSrc: Oral   SpO2: 97%   Weight: 95.3 kg (210 lb)   Height: 5' 10" (1.778 m)       Chief Complaint: Cough    Patient reports productive cough for 3-4 days.Patient seen here for bronchitis.    43-year-old male presents to the clinic complaining 4-5 days of worsening cough with brown/green sputum, postnasal drip, sinus congestion. Denies any fever.     He was seen in July and diagnosed with bacterial bronchitis and given doxycycline and promethazine DM.  He is requesting the exact same treatment because this worked last time. His symptoms resolved completely and returned 4 days ago.    Cough   This is a new problem. Episode onset: 3-4 days. The problem has been gradually worsening. The problem occurs hourly. The cough is productive of brown sputum and productive of sputum. Associated symptoms include nasal congestion and rhinorrhea. Pertinent negatives include no chest pain, chills, ear pain, eye redness, fever, headaches, myalgias, sore throat, shortness of breath or wheezing. The symptoms are aggravated by lying down. Treatments tried: Ny Quil;Day Quil. The treatment provided mild relief. His past medical history is significant for bronchitis. There is no history of asthma.     Review of Systems   Constitution: Negative for chills, fever and malaise/fatigue.   HENT: Positive for congestion and rhinorrhea. Negative for ear pain, hoarse voice and sore throat.         Sinus pressure   Eyes: Negative for discharge and redness.   Cardiovascular: Negative for chest pain, dyspnea on exertion and leg swelling.   Respiratory: Positive for cough and sputum production (brown,green,yellow). Negative for shortness of breath and wheezing.    Musculoskeletal: Negative for myalgias.   Gastrointestinal: Negative for abdominal pain and nausea.   Neurological: Negative for " headaches.       Objective:      Physical Exam   Constitutional: He is oriented to person, place, and time. He appears well-developed and well-nourished. He is cooperative.  Non-toxic appearance. He does not appear ill. No distress.   HENT:   Head: Normocephalic and atraumatic.   Right Ear: Hearing, tympanic membrane, external ear and ear canal normal.   Left Ear: Hearing, tympanic membrane, external ear and ear canal normal.   Nose: Mucosal edema and rhinorrhea present. No nasal deformity. No epistaxis. Right sinus exhibits no maxillary sinus tenderness and no frontal sinus tenderness. Left sinus exhibits no maxillary sinus tenderness and no frontal sinus tenderness.   Mouth/Throat: Uvula is midline, oropharynx is clear and moist and mucous membranes are normal. No trismus in the jaw. Normal dentition. No uvula swelling. No posterior oropharyngeal erythema.   Eyes: Conjunctivae and lids are normal. No scleral icterus.   Sclera clear bilat   Neck: Trachea normal, full passive range of motion without pain and phonation normal. Neck supple.   Cardiovascular: Normal rate, regular rhythm, normal heart sounds, intact distal pulses and normal pulses.   Pulmonary/Chest: Effort normal and breath sounds normal. No stridor. No respiratory distress. He has no decreased breath sounds. He has no wheezes. He has no rhonchi. He has no rales.   Good breath sounds throughout all lung fields.  No wheezes rales or rhonchi.   Abdominal: Soft. Normal appearance and bowel sounds are normal. He exhibits no distension. There is no tenderness.   Musculoskeletal: Normal range of motion. He exhibits no edema or deformity.   Neurological: He is alert and oriented to person, place, and time. He exhibits normal muscle tone. Coordination normal.   Skin: Skin is warm, dry and intact. He is not diaphoretic. No pallor.   Psychiatric: He has a normal mood and affect. His speech is normal and behavior is normal. Judgment and thought content normal.  Cognition and memory are normal.   Nursing note and vitals reviewed.      Assessment:       1. Acute bacterial bronchitis        Plan:       [age] year male with symptoms identical to previous visit diagnosed with bacterial bronchitis .  Discussed with patient that this could viral and in that case antibiotics would not work. Will treat for bacterial bronchitis and recommend follow up with pcp in 1 week to assess resolution of symptoms.      Kishore was seen today for cough.    Diagnoses and all orders for this visit:    Acute bacterial bronchitis  -     betamethasone acetate-betamethasone sodium phosphate injection 6 mg  -     doxycycline (VIBRA-TABS) 100 MG tablet; Take 1 tablet (100 mg total) by mouth 2 (two) times daily. for 7 days  -     promethazine-dextromethorphan (PROMETHAZINE-DM) 6.25-15 mg/5 mL Syrp; Take 5 mLs by mouth nightly as needed. 5 mL every 4 to 6 hours; maximum: 30 mL in 24 hours            Patient Instructions   Please go to the emergency department if your symptoms worsen or change.  Please follow-up with your primary care provider within week to assess resolution of your cough and symptoms.        Bronchitis with Wheezing (Viral or Bacterial: Adult)    Bronchitis is an infection of the air passages. It often occurs during a cold and is usually caused by a virus. Symptoms include cough with mucus (phlegm) and low-grade fever. This illness is contagious during the first few days and is spread through the air by coughing and sneezing, or by direct contact (touching the sick person and then touching your own eyes, nose, or mouth).  If there is a lot of inflammation, air flow is restricted. The air passages may also go into spasm, especially if you have asthma. This causes wheezing and difficulty breathing even in people who do not have asthma.  Bronchitis usually lasts 7 to 14 days. The wheezing should improve with treatment during the first week. An inhaler is often prescribed to relax the  air passages and stop wheezing. Antibiotics will be prescribed if your doctor thinks there is also a secondary bacterial infection.  Home care  · If symptoms are severe, rest at home for the first 2 to 3 days. When you go back to your usual activities, don't let yourself get too tired.  · Do not smoke. Also avoid being exposed to secondhand smoke.  · You may use over-the-counter medicine to control fever or pain, unless another medicine was prescribed. Note: If you have chronic liver or kidney disease or have ever had a stomach ulcer or gastrointestinal bleeding, talk with your healthcare provider before using these medicines. Also talk to your provider if you are taking medicine to prevent blood clots.) Aspirin should never be given to anyone younger than 18 years of age who is ill with a viral infection or fever. It may cause severe liver or brain damage.  · Your appetite may be poor, so a light diet is fine. Avoid dehydration by drinking 6 to 8 glasses of fluids per day (such as water, soft drinks, sports drinks, juices, tea, or soup). Extra fluids will help loosen secretions in the nose and lungs.  · Over-the-counter cough, cold, and sore-throat medicines will not shorten the length of the illness, but they may be helpful to reduce symptoms. (Note: Do not use decongestants if you have high blood pressure.)  · If you were given an inhaler, use it exactly as directed. If you need to use it more often than prescribed, your condition may be worsening. If this happens, contact your healthcare provider.  · If prescribed, finish all antibiotic medicine, even if you are feeling better after only a few days.  Follow-up care  Follow up with your healthcare provider, or as advised. If you had an X-ray or ECG (electrocardiogram), a specialist will review it. You will be notified of any new findings that may affect your care.  Note: If you are age 65 or older, or if you have a chronic lung disease or condition that affects  your immune system, or you smoke, talk to your healthcare provider about having a pneumococcal vaccinations and a yearly influenza vaccination (flu shot).  When to seek medical advice  Call your healthcare provider right away if any of these occur:  · Fever of 100.4°F (38°C) or higher  · Coughing up increasing amounts of colored sputum  · Weakness, drowsiness, headache, facial pain, ear pain, or a stiff neck  Call 911, or get immediate medical care  Contact emergency services right away if any of these occur.  · Coughing up blood  · Worsening weakness, drowsiness, headache, or stiff neck  · Increased wheezing not helped with medication, shortness of breath, or pain with breathing  Date Last Reviewed: 9/13/2015  © 9581-9943 [employer]. [address] [place] [address], [place] [address]. All rights reserved. This information is not intended as a substitute for professional medical care. Always follow your healthcare professional's instructions.

## 2019-09-25 ENCOUNTER — TELEPHONE (OUTPATIENT)
Dept: URGENT CARE | Facility: CLINIC | Age: 44
End: 2019-09-25

## 2020-06-03 ENCOUNTER — OFFICE VISIT (OUTPATIENT)
Dept: URGENT CARE | Facility: CLINIC | Age: 45
End: 2020-06-03
Payer: COMMERCIAL

## 2020-06-03 VITALS
SYSTOLIC BLOOD PRESSURE: 141 MMHG | BODY MASS INDEX: 30.06 KG/M2 | HEART RATE: 109 BPM | OXYGEN SATURATION: 96 % | HEIGHT: 70 IN | DIASTOLIC BLOOD PRESSURE: 86 MMHG | TEMPERATURE: 97 F | WEIGHT: 210 LBS

## 2020-06-03 DIAGNOSIS — M54.9 LEFT-SIDED BACK PAIN, UNSPECIFIED BACK LOCATION, UNSPECIFIED CHRONICITY: Primary | ICD-10-CM

## 2020-06-03 PROCEDURE — 99214 PR OFFICE/OUTPT VISIT, EST, LEVL IV, 30-39 MIN: ICD-10-PCS | Mod: S$GLB,,, | Performed by: EMERGENCY MEDICINE

## 2020-06-03 PROCEDURE — 99214 OFFICE O/P EST MOD 30 MIN: CPT | Mod: S$GLB,,, | Performed by: EMERGENCY MEDICINE

## 2020-06-03 RX ORDER — TRAMADOL HYDROCHLORIDE 50 MG/1
50 TABLET ORAL EVERY 6 HOURS PRN
Qty: 28 TABLET | Refills: 0 | Status: SHIPPED | OUTPATIENT
Start: 2020-06-03 | End: 2020-06-10

## 2020-06-03 NOTE — PATIENT INSTRUCTIONS
Rest and ice sore areas  Continue ibuprofen or Duexis for inflammation  Tramadol prescription for pain  See back pain sheet  Return for any concerns or problems.      General Neck and Back Pain    Both neck and back pain are usually caused by injury to the muscles or ligaments of the spine. Sometimes the disks that separate each bone of the spine may cause pain by pressing on a nearby nerve. Back and neck pain may appear after a sudden twisting or bending force (such as in a car accident), or sometimes after a simple awkward movement. In either case, muscle spasm is often present and adds to the pain.  Acute neck and back pain usually gets better in 1 to 2 weeks. Pain related to disk disease, arthritis in the spinal joints or spinal stenosis (narrowing of the spinal canal) can become chronic and last for months or years.  Back and neck pain are common problems. Most people feel better in 1 or 2 weeks, and most of the rest in 1 to 2 months. Most people can remain active.  People experience and describe pain differently.  · Pain can be sharp, stabbing, shooting, aching, cramping, or burning  · Movement, standing, bending, lifting, sitting, or walking may worsen the pain  · Pain can be localized to one spot or area, or it can be more generalized  · Pain can spread or radiate upwards, downwards, to the front, or go down your arms  · Muscle spasm may occur.  Most of the time mechanical problems with the muscles or spine cause the pain. it is usually caused by an injury, whether known or not, to the muscles or ligaments. While illnesses can cause back pain, it is usually not caused by a serious illness. Pain is usually related to physical activity, whether sports, exercise, work, or normal activity. Sometimes it can occur without an identifiable cause. This can happen simply by stretching or moving wrong, without noting pain at the time. Other causes include:  · Overexertion, lifting, pushing, pulling incorrectly or too  aggressively.  · Sudden twisting, bending or stretching from an accident (car or fall), or accidental movement.  · Poor posture  · Poor conditioning, lack of regular exercise  · Spinal disc disease or arthritis  · Stress  · Pregnancy, or illness like appendicitis, bladder or kidney infection, pelvic infections   Home care  · For neck pain: Use a comfortable pillow that supports the head and keeps the spine in a neutral position. The position of the head should not be tilted forward or backward.  · When in bed, try to find a position of comfort. A firm mattress is best. Try lying flat on your back with pillows under your knees. You can also try lying on your side with your knees bent up towards your chest and a pillow between your knees.  · At first, do not try to stretch out the sore spots. If there is a strain, it is not like the good soreness you get after exercising without an injury. In this case, stretching may make it worse.  · Avoid prolonged sitting, long car rides or travel. This puts more stress on the lower back than standing or walking.  · During the first 24 to 72 hours after an injury, apply an ice pack to the painful area for 20 minutes and then remove it for 20 minutes over a period of 60 to 90 minutes or several times a day.   · You can alternate ice and heat therapies. Talk with your healthcare provider about the best treatment for your back or neck pain. As a safety precaution, do not use a heating pad at bedtime. Sleeping with a heating pad can lead to skin burns or tissue damage.  · Therapeutic massage can help relax the back and neck muscles without stretching them.  · Be aware of safe lifting methods and do not lift anything over 15 pounds until all the pain is gone.  Medications  Talk to your healthcare provider before using medicine, especially if you have other medical problems or are taking other medicines.  · You may use over-the-counter medicine to control pain, unless another pain  medicine was prescribed. If you have chronic conditions like diabetes, liver or kidney disease, stomach ulcers,  gastrointestinal bleeding, or are taking blood thinner medicines.  · Be careful if you are given pain medicines, narcotics, or medicine for muscle spasm. They can cause drowsiness, and can affect your coordination, reflexes, and judgment. Do not drive or operate heavy machinery.  Follow-up care  Follow up with your healthcare provider, or as advised. Physical therapy or further tests may be needed.  If X-rays were taken, you will be notified of any new findings that may affect your care.  Call 911  Seek emergency medical care if any of the following occur:  · Trouble breathing  · Confusion  · Very drowsy or trouble awakening  · Fainting or loss of consciousness  · Rapid or very slow heart rate  · Loss of bowel or bladder control  When to seek medical advice  Call your healthcare provider right away if any of these occur:  · Pain becomes worse or spreads into your arms or legs  · Weakness, numbness or pain in one or both arms or legs  · Numbness in the groin area  · Difficulty walking  · Fever of 100.4ºF (38ºC) or higher, or as directed by your healthcare provider  Date Last Reviewed: 7/1/2016  © 9242-5282 [employer]. [address] [place] [address], [place] [address]. All rights reserved. This information is not intended as a substitute for professional medical care. Always follow your healthcare professional's instructions.

## 2020-06-03 NOTE — PROGRESS NOTES
"Subjective:       Patient ID: Kishore Silverio is a 44 y.o. male.    Vitals:  height is 5' 10" (1.778 m) and weight is 95.3 kg (210 lb). His temperature is 97.2 °F (36.2 °C). His blood pressure is 141/86 (abnormal) and his pulse is 109. His oxygen saturation is 96%.     Chief Complaint: Back Pain    Patient complains of tightness in upper back between shoulder blades for past few days. Patient states it wakes him up during the night. Reports success in the past with tramadol. Has been taking ibuprofen and duexis with mild success.     Back Pain   This is a new problem. The current episode started in the past 7 days. The problem occurs intermittently. The problem is unchanged. The pain is present in the thoracic spine. Quality: tightness. The pain does not radiate. The pain is at a severity of 0/10. The patient is experiencing no pain. The pain is worse during the night. The symptoms are aggravated by lying down. Pertinent negatives include no abdominal pain, bladder incontinence, bowel incontinence, dysuria or numbness.       Constitution: Negative for fatigue.   Gastrointestinal: Negative for abdominal pain and bowel incontinence.   Genitourinary: Negative for dysuria, urgency, bladder incontinence and hematuria.   Musculoskeletal: Positive for back pain. Negative for muscle cramps and history of spine disorder.   Skin: Negative for rash.   Neurological: Negative for coordination disturbances, numbness and tingling.       Objective:      Physical Exam   Constitutional: He is oriented to person, place, and time. Vital signs are normal. He appears well-developed and well-nourished. He is active and cooperative. No distress.   HENT:   Head: Normocephalic and atraumatic.   Nose: Nose normal.   Mouth/Throat: Oropharynx is clear and moist and mucous membranes are normal.   Eyes: Conjunctivae and lids are normal.   Neck: Trachea normal, normal range of motion, full passive range of motion without pain and phonation normal. " Neck supple.   Cardiovascular: Normal rate, regular rhythm, normal heart sounds, intact distal pulses and normal pulses.   Pulmonary/Chest: Effort normal and breath sounds normal.   Abdominal: Soft. Normal appearance and bowel sounds are normal. He exhibits no abdominal bruit, no pulsatile midline mass and no mass.   Musculoskeletal: He exhibits tenderness (ttp back left paraspinous thoracic muscles). He exhibits no edema or deformity.   Neurological: He is alert and oriented to person, place, and time. He has normal strength and normal reflexes. No sensory deficit.   Skin: Skin is warm, dry, intact and not diaphoretic.   Psychiatric: He has a normal mood and affect. His speech is normal. Cognition and memory are normal.   Nursing note and vitals reviewed.        Assessment:       1. Left-sided back pain, unspecified back location, unspecified chronicity        Plan:         Left-sided back pain, unspecified back location, unspecified chronicity    Other orders  -     traMADoL (ULTRAM) 50 mg tablet; Take 1 tablet (50 mg total) by mouth every 6 (six) hours as needed for Pain.  Dispense: 28 tablet; Refill: 0         Patient Instructions   Rest and ice sore areas  Continue ibuprofen or Duexis for inflammation  Tramadol prescription for pain  See back pain sheet  Return for any concerns or problems.      General Neck and Back Pain    Both neck and back pain are usually caused by injury to the muscles or ligaments of the spine. Sometimes the disks that separate each bone of the spine may cause pain by pressing on a nearby nerve. Back and neck pain may appear after a sudden twisting or bending force (such as in a car accident), or sometimes after a simple awkward movement. In either case, muscle spasm is often present and adds to the pain.  Acute neck and back pain usually gets better in 1 to 2 weeks. Pain related to disk disease, arthritis in the spinal joints or spinal stenosis (narrowing of the spinal canal) can become  chronic and last for months or years.  Back and neck pain are common problems. Most people feel better in 1 or 2 weeks, and most of the rest in 1 to 2 months. Most people can remain active.  People experience and describe pain differently.  · Pain can be sharp, stabbing, shooting, aching, cramping, or burning  · Movement, standing, bending, lifting, sitting, or walking may worsen the pain  · Pain can be localized to one spot or area, or it can be more generalized  · Pain can spread or radiate upwards, downwards, to the front, or go down your arms  · Muscle spasm may occur.  Most of the time mechanical problems with the muscles or spine cause the pain. it is usually caused by an injury, whether known or not, to the muscles or ligaments. While illnesses can cause back pain, it is usually not caused by a serious illness. Pain is usually related to physical activity, whether sports, exercise, work, or normal activity. Sometimes it can occur without an identifiable cause. This can happen simply by stretching or moving wrong, without noting pain at the time. Other causes include:  · Overexertion, lifting, pushing, pulling incorrectly or too aggressively.  · Sudden twisting, bending or stretching from an accident (car or fall), or accidental movement.  · Poor posture  · Poor conditioning, lack of regular exercise  · Spinal disc disease or arthritis  · Stress  · Pregnancy, or illness like appendicitis, bladder or kidney infection, pelvic infections   Home care  · For neck pain: Use a comfortable pillow that supports the head and keeps the spine in a neutral position. The position of the head should not be tilted forward or backward.  · When in bed, try to find a position of comfort. A firm mattress is best. Try lying flat on your back with pillows under your knees. You can also try lying on your side with your knees bent up towards your chest and a pillow between your knees.  · At first, do not try to stretch out the sore  spots. If there is a strain, it is not like the good soreness you get after exercising without an injury. In this case, stretching may make it worse.  · Avoid prolonged sitting, long car rides or travel. This puts more stress on the lower back than standing or walking.  · During the first 24 to 72 hours after an injury, apply an ice pack to the painful area for 20 minutes and then remove it for 20 minutes over a period of 60 to 90 minutes or several times a day.   · You can alternate ice and heat therapies. Talk with your healthcare provider about the best treatment for your back or neck pain. As a safety precaution, do not use a heating pad at bedtime. Sleeping with a heating pad can lead to skin burns or tissue damage.  · Therapeutic massage can help relax the back and neck muscles without stretching them.  · Be aware of safe lifting methods and do not lift anything over 15 pounds until all the pain is gone.  Medications  Talk to your healthcare provider before using medicine, especially if you have other medical problems or are taking other medicines.  · You may use over-the-counter medicine to control pain, unless another pain medicine was prescribed. If you have chronic conditions like diabetes, liver or kidney disease, stomach ulcers,  gastrointestinal bleeding, or are taking blood thinner medicines.  · Be careful if you are given pain medicines, narcotics, or medicine for muscle spasm. They can cause drowsiness, and can affect your coordination, reflexes, and judgment. Do not drive or operate heavy machinery.  Follow-up care  Follow up with your healthcare provider, or as advised. Physical therapy or further tests may be needed.  If X-rays were taken, you will be notified of any new findings that may affect your care.  Call 911  Seek emergency medical care if any of the following occur:  · Trouble breathing  · Confusion  · Very drowsy or trouble awakening  · Fainting or loss of consciousness  · Rapid or very  slow heart rate  · Loss of bowel or bladder control  When to seek medical advice  Call your healthcare provider right away if any of these occur:  · Pain becomes worse or spreads into your arms or legs  · Weakness, numbness or pain in one or both arms or legs  · Numbness in the groin area  · Difficulty walking  · Fever of 100.4ºF (38ºC) or higher, or as directed by your healthcare provider  Date Last Reviewed: 7/1/2016 © 2000-2017 [employer] [address] [place] [address]. All rights reserved. This information is not intended as a substitute for professional medical care. Always follow your healthcare professional's instructions.

## 2022-07-08 DIAGNOSIS — Z12.11 SPECIAL SCREENING FOR MALIGNANT NEOPLASMS, COLON: Primary | ICD-10-CM

## 2022-07-08 DIAGNOSIS — Z12.11 SCREENING FOR COLON CANCER: Primary | ICD-10-CM

## 2022-07-08 RX ORDER — POLYETHYLENE GLYCOL 3350, SODIUM SULFATE ANHYDROUS, SODIUM BICARBONATE, SODIUM CHLORIDE, POTASSIUM CHLORIDE 236; 22.74; 6.74; 5.86; 2.97 G/4L; G/4L; G/4L; G/4L; G/4L
4 POWDER, FOR SOLUTION ORAL ONCE
Qty: 4000 ML | Refills: 0 | Status: SHIPPED | OUTPATIENT
Start: 2022-07-08 | End: 2022-07-08

## 2022-09-20 RX ORDER — ONDANSETRON 8 MG/1
8 TABLET, ORALLY DISINTEGRATING ORAL EVERY 6 HOURS PRN
Qty: 30 TABLET | Refills: 0 | Status: SHIPPED | OUTPATIENT
Start: 2022-09-20

## 2022-09-21 ENCOUNTER — TELEPHONE (OUTPATIENT)
Dept: ENDOSCOPY | Facility: HOSPITAL | Age: 47
End: 2022-09-21
Payer: COMMERCIAL

## 2022-09-21 NOTE — TELEPHONE ENCOUNTER
Message received stated patient needing to cancel colonoscopy today. He vomited prep and still having solid stool. Called patient to discuss options to reschedule. Left voicemail message with direct number to call back.

## 2024-02-26 PROCEDURE — 95718 EEG PHYS/QHP 2-12 HR W/VEEG: CPT | Mod: ,,, | Performed by: PSYCHIATRY & NEUROLOGY

## 2024-02-27 ENCOUNTER — HOSPITAL ENCOUNTER (INPATIENT)
Facility: HOSPITAL | Age: 49
LOS: 2 days | Discharge: HOME | DRG: 062 | End: 2024-02-29
Attending: EMERGENCY MEDICINE | Admitting: PSYCHIATRY & NEUROLOGY
Payer: COMMERCIAL

## 2024-02-27 DIAGNOSIS — I63.9 STROKE: ICD-10-CM

## 2024-02-27 DIAGNOSIS — R29.818 ACUTE FOCAL NEUROLOGICAL DEFICIT: Primary | ICD-10-CM

## 2024-02-27 DIAGNOSIS — I63.331 CEREBROVASCULAR ACCIDENT (CVA) DUE TO THROMBOSIS OF RIGHT POSTERIOR CEREBRAL ARTERY: ICD-10-CM

## 2024-02-27 PROBLEM — I63.429 STROKE DUE TO EMBOLISM OF ANTERIOR CEREBRAL ARTERY: Status: ACTIVE | Noted: 2024-02-27

## 2024-02-27 PROBLEM — Z72.0 TOBACCO USE: Status: ACTIVE | Noted: 2024-02-27

## 2024-02-27 PROBLEM — Z72.0 TOBACCO USE: Chronic | Status: ACTIVE | Noted: 2024-02-27

## 2024-02-27 PROBLEM — R53.1 ACUTE LEFT-SIDED WEAKNESS: Status: ACTIVE | Noted: 2024-02-27

## 2024-02-27 PROBLEM — Z92.82 S/P ADMN TPA IN DIFF FAC W/N LAST 24 HR BEF ADM TO CRNT FAC: Status: ACTIVE | Noted: 2024-02-27

## 2024-02-27 PROBLEM — I10 HTN (HYPERTENSION): Status: ACTIVE | Noted: 2024-02-27

## 2024-02-27 PROBLEM — I10 HTN (HYPERTENSION): Chronic | Status: ACTIVE | Noted: 2024-02-27

## 2024-02-27 LAB
ABO + RH BLD: NORMAL
ALBUMIN SERPL BCP-MCNC: 4.1 G/DL (ref 3.5–5.2)
ALP SERPL-CCNC: 73 U/L (ref 55–135)
ALT SERPL W/O P-5'-P-CCNC: 37 U/L (ref 10–44)
AMPHET+METHAMPHET UR QL: NEGATIVE
ANION GAP SERPL CALC-SCNC: 8 MMOL/L (ref 8–16)
AST SERPL-CCNC: 21 U/L (ref 10–40)
BARBITURATES UR QL SCN>200 NG/ML: NEGATIVE
BASOPHILS # BLD AUTO: 0.03 K/UL (ref 0–0.2)
BASOPHILS NFR BLD: 0.4 % (ref 0–1.9)
BENZODIAZ UR QL SCN>200 NG/ML: NEGATIVE
BILIRUB SERPL-MCNC: 0.3 MG/DL (ref 0.1–1)
BILIRUB UR QL STRIP: NEGATIVE
BLD GP AB SCN CELLS X3 SERPL QL: NORMAL
BUN SERPL-MCNC: 20 MG/DL (ref 6–20)
BZE UR QL SCN: NEGATIVE
CALCIUM SERPL-MCNC: 9.5 MG/DL (ref 8.7–10.5)
CANNABINOIDS UR QL SCN: NEGATIVE
CHLORIDE SERPL-SCNC: 105 MMOL/L (ref 95–110)
CHOLEST SERPL-MCNC: 211 MG/DL (ref 120–199)
CHOLEST/HDLC SERPL: 4.6 {RATIO} (ref 2–5)
CLARITY UR: CLEAR
CO2 SERPL-SCNC: 25 MMOL/L (ref 23–29)
COLOR UR: YELLOW
CREAT SERPL-MCNC: 0.9 MG/DL (ref 0.5–1.4)
CREAT SERPL-MCNC: 0.9 MG/DL (ref 0.5–1.4)
CREAT UR-MCNC: 61.7 MG/DL (ref 23–375)
DIFFERENTIAL METHOD BLD: NORMAL
EOSINOPHIL # BLD AUTO: 0.1 K/UL (ref 0–0.5)
EOSINOPHIL NFR BLD: 1.9 % (ref 0–8)
ERYTHROCYTE [DISTWIDTH] IN BLOOD BY AUTOMATED COUNT: 13.3 % (ref 11.5–14.5)
EST. GFR  (NO RACE VARIABLE): >60 ML/MIN/1.73 M^2
ESTIMATED AVG GLUCOSE: 134 MG/DL (ref 68–131)
ETHANOL UR-MCNC: <10 MG/DL
GLUCOSE SERPL-MCNC: 110 MG/DL (ref 70–110)
GLUCOSE UR QL STRIP: NEGATIVE
HBA1C MFR BLD: 6.3 % (ref 4–5.6)
HCT VFR BLD AUTO: 44 % (ref 40–54)
HCV AB SERPL QL IA: NEGATIVE
HDLC SERPL-MCNC: 46 MG/DL (ref 40–75)
HDLC SERPL: 21.8 % (ref 20–50)
HGB BLD-MCNC: 14.6 G/DL (ref 14–18)
HGB UR QL STRIP: NEGATIVE
HIV 1+2 AB+HIV1 P24 AG SERPL QL IA: NEGATIVE
IMM GRANULOCYTES # BLD AUTO: 0.01 K/UL (ref 0–0.04)
IMM GRANULOCYTES NFR BLD AUTO: 0.1 % (ref 0–0.5)
INR PPP: 1 (ref 0.8–1.2)
KETONES UR QL STRIP: NEGATIVE
LDLC SERPL CALC-MCNC: 120.8 MG/DL (ref 63–159)
LEUKOCYTE ESTERASE UR QL STRIP: NEGATIVE
LYMPHOCYTES # BLD AUTO: 2.1 K/UL (ref 1–4.8)
LYMPHOCYTES NFR BLD: 30.5 % (ref 18–48)
MCH RBC QN AUTO: 27 PG (ref 27–31)
MCHC RBC AUTO-ENTMCNC: 33.2 G/DL (ref 32–36)
MCV RBC AUTO: 82 FL (ref 82–98)
METHADONE UR QL SCN>300 NG/ML: NEGATIVE
MONOCYTES # BLD AUTO: 0.5 K/UL (ref 0.3–1)
MONOCYTES NFR BLD: 7.6 % (ref 4–15)
NEUTROPHILS # BLD AUTO: 4.2 K/UL (ref 1.8–7.7)
NEUTROPHILS NFR BLD: 59.5 % (ref 38–73)
NITRITE UR QL STRIP: NEGATIVE
NONHDLC SERPL-MCNC: 165 MG/DL
NRBC BLD-RTO: 0 /100 WBC
OHS QRS DURATION: 78 MS
OHS QTC CALCULATION: 408 MS
OPIATES UR QL SCN: NEGATIVE
PCP UR QL SCN>25 NG/ML: NEGATIVE
PH UR STRIP: 7 [PH] (ref 5–8)
PLATELET # BLD AUTO: 237 K/UL (ref 150–450)
PMV BLD AUTO: 10.4 FL (ref 9.2–12.9)
POCT GLUCOSE: 104 MG/DL (ref 70–110)
POCT GLUCOSE: 110 MG/DL (ref 70–110)
POCT GLUCOSE: 150 MG/DL (ref 70–110)
POTASSIUM SERPL-SCNC: 4.1 MMOL/L (ref 3.5–5.1)
PROT SERPL-MCNC: 7.3 G/DL (ref 6–8.4)
PROT UR QL STRIP: NEGATIVE
PROTHROMBIN TIME: 11.2 SEC (ref 9–12.5)
RBC # BLD AUTO: 5.4 M/UL (ref 4.6–6.2)
SAMPLE: NORMAL
SODIUM SERPL-SCNC: 138 MMOL/L (ref 136–145)
SP GR UR STRIP: >1.03 (ref 1–1.03)
SPECIMEN OUTDATE: NORMAL
TOXICOLOGY INFORMATION: NORMAL
TRIGL SERPL-MCNC: 221 MG/DL (ref 30–150)
TROPONIN I SERPL DL<=0.01 NG/ML-MCNC: <0.006 NG/ML (ref 0–0.03)
TSH SERPL DL<=0.005 MIU/L-ACNC: 3.93 UIU/ML (ref 0.4–4)
URN SPEC COLLECT METH UR: ABNORMAL
UROBILINOGEN UR STRIP-ACNC: NEGATIVE EU/DL
WBC # BLD AUTO: 6.98 K/UL (ref 3.9–12.7)

## 2024-02-27 PROCEDURE — 92523 SPEECH SOUND LANG COMPREHEN: CPT

## 2024-02-27 PROCEDURE — 99285 EMERGENCY DEPT VISIT HI MDM: CPT | Mod: 25

## 2024-02-27 PROCEDURE — 96375 TX/PRO/DX INJ NEW DRUG ADDON: CPT

## 2024-02-27 PROCEDURE — 85610 PROTHROMBIN TIME: CPT | Performed by: EMERGENCY MEDICINE

## 2024-02-27 PROCEDURE — 25000003 PHARM REV CODE 250: Performed by: EMERGENCY MEDICINE

## 2024-02-27 PROCEDURE — 96365 THER/PROPH/DIAG IV INF INIT: CPT

## 2024-02-27 PROCEDURE — 3E03317 INTRODUCTION OF OTHER THROMBOLYTIC INTO PERIPHERAL VEIN, PERCUTANEOUS APPROACH: ICD-10-PCS | Performed by: PSYCHIATRY & NEUROLOGY

## 2024-02-27 PROCEDURE — 86850 RBC ANTIBODY SCREEN: CPT | Performed by: PSYCHIATRY & NEUROLOGY

## 2024-02-27 PROCEDURE — 99291 CRITICAL CARE FIRST HOUR: CPT | Mod: ,,,

## 2024-02-27 PROCEDURE — 82962 GLUCOSE BLOOD TEST: CPT

## 2024-02-27 PROCEDURE — 85025 COMPLETE CBC W/AUTO DIFF WBC: CPT | Performed by: EMERGENCY MEDICINE

## 2024-02-27 PROCEDURE — 63600175 PHARM REV CODE 636 W HCPCS: Performed by: EMERGENCY MEDICINE

## 2024-02-27 PROCEDURE — 92610 EVALUATE SWALLOWING FUNCTION: CPT

## 2024-02-27 PROCEDURE — 93005 ELECTROCARDIOGRAM TRACING: CPT

## 2024-02-27 PROCEDURE — 86803 HEPATITIS C AB TEST: CPT | Performed by: EMERGENCY MEDICINE

## 2024-02-27 PROCEDURE — 93010 ELECTROCARDIOGRAM REPORT: CPT | Mod: ,,, | Performed by: INTERNAL MEDICINE

## 2024-02-27 PROCEDURE — A4216 STERILE WATER/SALINE, 10 ML: HCPCS | Performed by: EMERGENCY MEDICINE

## 2024-02-27 PROCEDURE — 80307 DRUG TEST PRSMV CHEM ANLYZR: CPT

## 2024-02-27 PROCEDURE — 99291 CRITICAL CARE FIRST HOUR: CPT

## 2024-02-27 PROCEDURE — 37195 THROMBOLYTIC THERAPY STROKE: CPT

## 2024-02-27 PROCEDURE — 63600175 PHARM REV CODE 636 W HCPCS: Mod: JW,JG

## 2024-02-27 PROCEDURE — 81003 URINALYSIS AUTO W/O SCOPE: CPT

## 2024-02-27 PROCEDURE — 85610 PROTHROMBIN TIME: CPT

## 2024-02-27 PROCEDURE — 82565 ASSAY OF CREATININE: CPT

## 2024-02-27 PROCEDURE — 82803 BLOOD GASES ANY COMBINATION: CPT

## 2024-02-27 PROCEDURE — 97535 SELF CARE MNGMENT TRAINING: CPT

## 2024-02-27 PROCEDURE — 87389 HIV-1 AG W/HIV-1&-2 AB AG IA: CPT | Performed by: EMERGENCY MEDICINE

## 2024-02-27 PROCEDURE — 80061 LIPID PANEL: CPT | Performed by: EMERGENCY MEDICINE

## 2024-02-27 PROCEDURE — 83036 HEMOGLOBIN GLYCOSYLATED A1C: CPT | Performed by: PSYCHIATRY & NEUROLOGY

## 2024-02-27 PROCEDURE — 84443 ASSAY THYROID STIM HORMONE: CPT | Performed by: EMERGENCY MEDICINE

## 2024-02-27 PROCEDURE — 20000000 HC ICU ROOM

## 2024-02-27 PROCEDURE — 94761 N-INVAS EAR/PLS OXIMETRY MLT: CPT | Mod: XB

## 2024-02-27 PROCEDURE — 63600175 PHARM REV CODE 636 W HCPCS

## 2024-02-27 PROCEDURE — 80053 COMPREHEN METABOLIC PANEL: CPT | Performed by: EMERGENCY MEDICINE

## 2024-02-27 PROCEDURE — 99205 OFFICE O/P NEW HI 60 MIN: CPT | Mod: GT,,, | Performed by: PSYCHIATRY & NEUROLOGY

## 2024-02-27 PROCEDURE — 84484 ASSAY OF TROPONIN QUANT: CPT | Performed by: PSYCHIATRY & NEUROLOGY

## 2024-02-27 PROCEDURE — 25500020 PHARM REV CODE 255: Performed by: EMERGENCY MEDICINE

## 2024-02-27 PROCEDURE — 25000003 PHARM REV CODE 250

## 2024-02-27 PROCEDURE — 36415 COLL VENOUS BLD VENIPUNCTURE: CPT | Performed by: EMERGENCY MEDICINE

## 2024-02-27 RX ORDER — SODIUM,POTASSIUM PHOSPHATES 280-250MG
2 POWDER IN PACKET (EA) ORAL
Status: DISCONTINUED | OUTPATIENT
Start: 2024-02-27 | End: 2024-02-28

## 2024-02-27 RX ORDER — SODIUM CHLORIDE 0.9 % (FLUSH) 0.9 %
10 SYRINGE (ML) INJECTION
Status: DISCONTINUED | OUTPATIENT
Start: 2024-02-27 | End: 2024-02-28

## 2024-02-27 RX ORDER — AMOXICILLIN 250 MG
1 CAPSULE ORAL DAILY
Status: DISCONTINUED | OUTPATIENT
Start: 2024-02-27 | End: 2024-02-29 | Stop reason: HOSPADM

## 2024-02-27 RX ORDER — LABETALOL HCL 20 MG/4 ML
10 SYRINGE (ML) INTRAVENOUS EVERY 4 HOURS PRN
Status: DISCONTINUED | OUTPATIENT
Start: 2024-02-27 | End: 2024-02-29 | Stop reason: HOSPADM

## 2024-02-27 RX ORDER — ATORVASTATIN CALCIUM 40 MG/1
40 TABLET, FILM COATED ORAL DAILY
Status: DISCONTINUED | OUTPATIENT
Start: 2024-02-27 | End: 2024-02-29 | Stop reason: HOSPADM

## 2024-02-27 RX ORDER — NICARDIPINE HYDROCHLORIDE 0.2 MG/ML
0-15 INJECTION INTRAVENOUS CONTINUOUS
Status: DISCONTINUED | OUTPATIENT
Start: 2024-02-27 | End: 2024-02-27

## 2024-02-27 RX ORDER — HYDRALAZINE HYDROCHLORIDE 20 MG/ML
10 INJECTION INTRAMUSCULAR; INTRAVENOUS EVERY 4 HOURS PRN
Status: DISCONTINUED | OUTPATIENT
Start: 2024-02-27 | End: 2024-02-29 | Stop reason: HOSPADM

## 2024-02-27 RX ORDER — BISACODYL 10 MG/1
10 SUPPOSITORY RECTAL DAILY PRN
Status: DISCONTINUED | OUTPATIENT
Start: 2024-02-27 | End: 2024-02-29 | Stop reason: HOSPADM

## 2024-02-27 RX ORDER — ACETAMINOPHEN 325 MG/1
650 TABLET ORAL EVERY 6 HOURS PRN
Status: DISCONTINUED | OUTPATIENT
Start: 2024-02-27 | End: 2024-02-29 | Stop reason: HOSPADM

## 2024-02-27 RX ADMIN — Medication 25 MG: at 10:02

## 2024-02-27 RX ADMIN — HYDRALAZINE HYDROCHLORIDE 10 MG: 20 INJECTION, SOLUTION INTRAMUSCULAR; INTRAVENOUS at 03:02

## 2024-02-27 RX ADMIN — Medication 10 ML: at 10:02

## 2024-02-27 RX ADMIN — ATORVASTATIN CALCIUM 40 MG: 40 TABLET, FILM COATED ORAL at 03:02

## 2024-02-27 RX ADMIN — NICARDIPINE HYDROCHLORIDE 5 MG/HR: 0.2 INJECTION, SOLUTION INTRAVENOUS at 12:02

## 2024-02-27 RX ADMIN — IOHEXOL 100 ML: 350 INJECTION, SOLUTION INTRAVENOUS at 09:02

## 2024-02-27 RX ADMIN — ACETAMINOPHEN 650 MG: 325 TABLET ORAL at 03:02

## 2024-02-27 NOTE — ED TRIAGE NOTES
Pt brought in to room 1 from ED ramp c/o L side weakness, L side neglect, slurred speech, L facial droop and L side visual deficit onset this am at approx 0845. Pts wife reports pt was complaining of a headache last pm, which he was still endorsing this am. LKN 0830 when pt left for work. Wife reports she received a call from pt at approx 0845 when pt was driving to work that he began feeling weak and dizzy, causing him to pull over. Wife reports she noted slurred speech at this time. Pt denies any known medical history. Pt AAO x4 with slurred speech.

## 2024-02-27 NOTE — PT/OT/SLP EVAL
"Speech Language Pathology Evaluation  Cognitive/Bedside Swallow/ Discharge     Patient Name:  Kishore Silverio   MRN:  [id number]  Admitting Diagnosis: Acute left-sided weakness    Recommendations:                  General Recommendations:  Follow-up not indicated  Diet recommendations:  Regular Diet - IDDSI Level 7, Thin liquids - IDDSI Level 0   Aspiration Precautions: Standard aspiration precautions   General Precautions: Standard,    Communication strategies:  none    Assessment:     Kishore Silverio is a 48 y.o. male with an SLP diagnosis of  functional swallow and cognitive-linguistic skills WFL .  No acute ST needs.    History:     Past Medical History:   Diagnosis Date    HTN (hypertension)     HTN (hypertension) 2/27/2024    Tobacco use      Past Surgical History:   Procedure Laterality Date    CYST REMOVAL       "Acute left-sided weakness  48 year old man with smoking, HTN (not on meds) presented to [facility] 2/27 for acute left sided weakness. Telestroke NIHSS 7. CTH no acute abnormality and CTA negative for LVO. Received TNK prior to transfer. Arrival NIHSS 3 with improvement in LSW. To be admitted to [facility] for post-TNK monitoring."    Social History: Patient lives with spouse.    Prior Intubation HX:  none    Modified Barium Swallow: none    Chest X-Rays: none    Prior diet: regular/thin    Occupation: [email or web address]    Subjective     Spoke with RN prior to session. Pt awake/alert and agreeable to ST. Spouse at bedside.     Pain/Comfort:  Pain Rating 1: 0/10    Respiratory Status: Room air    Objective:     Cognitive Status:    WFL  Arousal/Alertness Appropriate response to stimuli  Attention No obvious deficits observed    Orientation Oriented x4  Memory Immediate Recall 3/3 and Delayed3/3  Problem Solving 100%  Safety awareness wfl       Receptive Language:   Comprehension:      WFL  Conversation WFL    Pragmatics:    wfl    Expressive Language:  Verbal:    wfl  Initiation wfl  Conversational speech " wfl      Motor Speech:  WFL    Voice:   WFL    Visual-Spatial:  [facility]  Clock drawing 100% acc    Reading:   [facility]   Glasses for distance    Written Expression:   WFL  Dominant hand: Right  Assessment hand: Right  Sentence formulation wfl    Oral Musculature Evaluation  Oral Musculature: WFL  Dentition: present and adequate  Secretion Management: adequate  Mucosal Quality: good  Mandibular Strength and Mobility: WFL  Oral Labial Strength and Mobility: WFL  Lingual Strength and Mobility: [facility]  Velar Elevation: [facility]  Buccal Strength and Mobility: [facility]  Volitional Cough: present  Volitional Swallow: timely  Voice Prior to PO Intake: clear, strong    Bedside Swallow Eval:   Consistencies Assessed:  Thin liquids 8 oz  Solids cracker x2      Oral Phase:   WFL    Pharyngeal Phase:   no overt clinical signs/symptoms of aspiration  no overt clinical signs/symptoms of pharyngeal dysphagia    Compensatory Strategies  None    Treatment: Pt able to independently sit upright, self present and tolerate all consistencies recommend a regular diet and thin liquids. Educated pt on oral motor exercises to address slight residual left sided facial weakness. Education provided re: role of SLP, diet recs, swallow precs, s/s aspiration and POC.  Pt and spouse verbalized understanding and agreement. No further acute ST needs.    Plan:     Plan of Care reviewed with:  patient, spouse   SLP Follow-Up:  No       Discharge recommendations:  Therapy Intensity Recommendations at Discharge: No Therapy Indicated   Barriers to Discharge:  None    Time Tracking:     SLP Treatment Date:   02/27/24  Speech Start Time:  1507  Speech Stop Time:  1535     Speech Total Time (min):  28 min    Billable Minutes: Eval 10 , Eval Swallow and Oral Function 8, and Self Care/Home Management Training 10    02/27/2024

## 2024-02-27 NOTE — ED NOTES
Per lab, they cannot see the patient's lab orders in. Will print labels and tube to lab at this time.

## 2024-02-27 NOTE — TELEMEDICINE CONSULT
[facility]  Vascular Neurology  Comprehensive Stroke Center  TeleVascular Neurology Acute Consultation Note        Consult Information  Consults    Consulting Provider: JUAN F TELLO   Current Providers  No providers found    Patient Location:  [facility] EMERGENCY DEPARTMENT Emergency Department    Spoke hospital nurse at bedside with patient assisting consultant.  Patient information was obtained from patient, spouse/SO, past medical records, and primary team.       Stroke Documentation  Acute Stroke Times   Last Known Normal Date: 02/27/24  Last Known Normal Time: 0830  Symptom Onset Date: 02/27/24  Symptom Onset Time: 0830  Stroke Team Called Date: 02/27/24  Stroke Team Called Time: 0948  Stroke Team Arrival Date: 02/27/24  Stroke Team Arrival Time: 0955  CT Interpretation Time: 0955  Thrombolytic Therapy Recommended: Yes  CTA Interpretation Time: 0955  Thrombectomy Recommended: No  Decision to Treat Time for Tenecteplase: 1010    NIH Scale:  Interval: baseline  1a. Level of Consciousness: 0-->Alert, keenly responsive  1b. LOC Questions: 0-->Answers both questions correctly  1c. LOC Commands: 0-->Performs both tasks correctly  2. Best Gaze: 0-->Normal  3. Visual: 0-->No visual loss  4. Facial Palsy: 2-->Partial paralysis (total or near-total paralysis of lower face)  5a. Motor Arm, Left: 2-->Some effort against gravity, limb cannot get to or maintain (if cued) 90 (or 45) degrees, drifts down to bed, but has some effort against gravity  5b. Motor Arm, Right: 0-->No drift, limb holds 90 (or 45) degrees for full 10 secs  6a. Motor Leg, Left: 2-->Some effort against gravity, leg falls to bed by 5 secs, but has some effort against gravity  6b. Motor Leg, Right: 0-->No drift, leg holds 30 degree position for full 5 secs  7. Limb Ataxia: 0-->Absent  8. Sensory: 0-->Normal, no sensory loss  9. Best Language: 0-->No aphasia, normal  10. Dysarthria: 1-->Mild-to-moderate dysarthria, patient slurs at  least some words and, at worst, can be understood with some difficulty  11. Extinction and Inattention (formerly Neglect): 0-->No abnormality  Total (NIH Stroke Scale): 7      Modified [place]: Score: 0  Napoleon Coma Scale: 15   ABCD2 Score:    [id number] Score:    HAS -BLED Score:    ICH Score:    Hunt & Lowry Classification:      Blood pressure (!) 184/114, pulse 78, temperature 98 °F (36.7 °C), temperature source Oral, resp. rate 20, weight 109.5 kg (241 lb 6.5 oz), SpO2 97 %.    Van Negative    Medical Decision Making  HPI:  48 y.o. male with a medical history that is relevant only for smoking and chronic low back pain, brought in due to acute onset L sided weakness, L face weakness, and slurred speech. Never had similar symptoms before.  Pt woke up with a HA, drove to work, and while driving developed the aforementioned symptoms and had to pull over.  Some improvement.     Images personally reviewed and interpreted:  Study: Head CT and CTA Head & Neck  Study Interpretation: no acute abnormality. No LVO, high grade stenosis, or significant carotid disease     Assessment and plan:  Acute R brain infarct. Recommended TNK.  Admit to NCC and follow post TNK protocol. Complete stroke work up. Vascular neurology and NCC consults.    Lytics recommendation: Recommend IV Tenecteplase 0.25mg/kg IV push (max dose 25mg); If Tenecteplase is not available use Alteplase 0.9mg/kg IV bolus followed by infusion (max dose 90mg)     Additional Recommendations:   Neurological assessment and vital signs (except temperature) every 15 minutes x 2 hours, then every 30 minutes x 6 hours, then every hour x 16 hours..  Frequency of BP assessments may need to be increased if systolic BP stays >= 180 mm Hg or diastolic BP stays >= 105 mm Hg. Administer antihypertensive meds as ordered  Temperature every 4 hours or as required.  Follow hospital protocol for further orders re: post thrombolytic therapy patient management.  No antithrombotics  or anticoagulants (including but not limited to: heparin, warfarin, aspirin, clopidigrel, or dipyridamole) for 24 hours, then start antithrombotics as ordered by treating physician    Adapted from the American Heart Association/American Stroke Association (AHA/ASA) and American Association of Neuroscience Nurses (AANN) Guidelines.   Thrombectomy recommendation: No; No large vessel occlusion identified on imaging  and No; at this time symptoms not suggestive of large vessel occlusion  Placement recommendation: transfer to [facility] by  ground  stat               ROS  Physical Exam  No past medical history on file.  Past Surgical History:   Procedure Laterality Date    CYST REMOVAL       Family History   Problem Relation Age of Onset    No Known Problems Mother     No Known Problems Father        Diagnoses  Problem Noted   Acute Left-Sided Weakness 2/27/2024       Jesu Pop MD      Emergent/Acute neurological consultation requested by spoke provider due to critical concerns for possible cerebrovascular event that could result in permanent loss of neurologic/bodily function, severe disability or death of this patient.  Immediate/timely evaluation by a highly prepared expert is paramount for optimal outcomes  High risk for neurological deterioration if not properly diagnosed  High risk for neurological deterioration if not treated promplty/as soon as possible  Complex diagnostic evaluation may be required (advanced imaging)  High risk treatment options (thrombolytics and/or thrombectomy)    Patient care was coordinated with spoke provider, including but not limted to    Discussing likely diagnosis/etiology of symptoms  Making recommendations for further diagnostic studies  Making recommendations for intravenous thrombolytics or other advanced therapies  Making recommendations for disposition (admission/transfer for higher level of care)

## 2024-02-27 NOTE — H&P
[facility]  Neurocritical Care  History & Physical    Admit Date: 2/27/2024  Service Date: 02/27/2024  Length of Stay: 0    Subjective:     Chief Complaint: Acute left-sided weakness    History of Present Illness: Mr. Chapincito Silverio is a 48 year old man with a PMHx of smoking and HTN who presents to [facility] s/p TNK administration. He initially presented to [facility] 2/27 for acute left sided weakness. Telestroke NIHSS 7. CTH no acute abnormality and CTA negative for LVO. Received TNK prior to transfer, end time 1030. Arrival NIHSS 3 with improvement in LSW. CT head was repeated upon arrival to [facility] out of concern for diminished wakefulness, which displayed questionable R thalamic hypodensity. He will be admitted to [facility] for hourly neuro-monitoring and a higher level of care.       Past Medical History:   Diagnosis Date    HTN (hypertension)     HTN (hypertension) 2/27/2024    Tobacco use      Past Surgical History:   Procedure Laterality Date    CYST REMOVAL        No current facility-administered medications on file prior to encounter.     Current Outpatient Medications on File Prior to Encounter   Medication Sig Dispense Refill    albuterol (PROVENTIL/VENTOLIN HFA) 90 mcg/actuation inhaler Inhale 2 puffs into the lungs every 6 (six) hours as needed for Wheezing. Rescue 1 Inhaler 0    benzonatate (TESSALON) 200 MG capsule Take 1 capsule (200 mg total) by mouth 3 (three) times daily as needed for Cough. 30 capsule 0    budesonide 180mcg (PULMICORT 180MCG) 180 mcg/actuation AePB Inhale 2 puffs into the lungs once daily. Controller for 10 days 1 each 0    ondansetron (ZOFRAN-ODT) 8 MG TbDL Take 1 tablet (8 mg total) by mouth every 6 (six) hours as needed. 30 tablet 0    promethazine-dextromethorphan (PROMETHAZINE-DM) 6.25-15 mg/5 mL Syrp Take 5 mLs by mouth nightly as needed. 5 mL every 4 to 6 hours; maximum: 30 mL in 24 hours 118 mL 0      Allergies: Patient has no known allergies.  Family History    Problem Relation Age of Onset    No Known Problems Mother     No Known Problems Father      Social History     Tobacco Use    Smoking status: Every Day     Current packs/day: 0.50     Types: Cigarettes    Smokeless tobacco: Never   Substance Use Topics    Alcohol use: Yes    Drug use: No     Review of Systems   Eyes:  Negative for photophobia and visual disturbance.   Neurological:  Positive for dizziness, facial asymmetry, weakness and headaches. Negative for seizures, speech difficulty, light-headedness and numbness.     Objective:     Vitals:    Temp: 98.2 °F (36.8 °C)  Pulse: 96  Rhythm: normal sinus rhythm  BP: (!) 181/96  MAP (mmHg): 131  Resp: 10  SpO2: 99 %    Temp  Min: 98 °F (36.7 °C)  Max: 98.2 °F (36.8 °C)  Pulse  Min: 70  Max: 96  BP  Min: 133/72  Max: 204/120  MAP (mmHg)  Min: 97  Max: 156  Resp  Min: 10  Max: 20  SpO2  Min: 95 %  Max: 100 %    No intake/output data recorded.            Physical Exam  Constitutional:       Appearance: Normal appearance. He is obese.   HENT:      Head: Normocephalic and atraumatic.   Cardiovascular:      Rate and Rhythm: Normal rate and regular rhythm.   Pulmonary:      Effort: Pulmonary effort is normal. No respiratory distress.   Neurological:      Comments: Sedation: None  E3V5M6  AAOx3  No aphasia, dysarthria  PERRLA, EOMI  Pupils brisk  L facial droop  Noted LUE ataxia  5/5 on R  3/5 on L       Current NIH Stroke Score Values      Flowsheet Row Most Recent Value   Interval shift assessment   1a. Level of Consciousness 1-->Not alert, but arousable by minor stimulation to obey, answer, or respond   1b. LOC Questions 0-->Answers both questions correctly   1c. LOC Commands 0-->Performs both tasks correctly   2. Best Gaze 0-->Normal   3. Visual 0-->No visual loss   4. Facial Palsy 1-->Minor paralysis (flattened nasolabial fold, asymmetry on smiling)   5a. Motor Arm, Left 1-->Drift, limb holds 90 (or 45) degrees, but drifts down before full 10 seconds, does not hit bed  or other support   5b. Motor Arm, Right 0-->No drift, limb holds 90 (or 45) degrees for full 10 secs   6a. Motor Leg, Left 1-->Drift, leg falls by the end of the 5-sec period but does not hit bed   6b. Motor Leg, Right 0-->No drift, leg holds 30 degree position for full 5 secs   7. Limb Ataxia 1-->Present in one limb   8. Sensory 0-->Normal, no sensory loss   9. Best Language 0-->No aphasia, normal   10. Dysarthria 0-->Normal   11. Extinction and Inattention (formerly Neglect) 0-->No abnormality   Total (NIH Stroke Scale) 5             Today I personally reviewed pertinent medications, lines/drains/airways, imaging, cardiology results, laboratory results, microbiology results, notably: CT        Assessment/Plan:     Neuro  * Acute left-sided weakness  49 y/o M presents to [facility] with acute LSW. CT negative for acute abnormality, CTA negative for LVO. TNK administered at 1043 on 2/27, with improvement in NIH from 7 to 3 per stroke team documentation. CT repeated in ED due to waxing/waning wakefulness with questionable hypodensity in R thalamus. NIH 5 in NCC. Stroke team aware of change.    -Admit to NCC, stroke team following  -q1h neuro checks, vital checks  -EKG, ECHO, CXR  -A1c, TSH, lipid panel  -Daily CBC, CMP, mag, phos  -SBP < 180, prn labetalol and hydralazine  -Statin  -SCDs, Hold ACAP in acute post TNK setting  -PT/OT/SLP  -MRI pending, EEG cap ordered to r/o seizures in setting of waxing/waning exam    Cardiac/Vascular  HTN (hypertension)  No documented home meds  SBP goal < 180  PRN labetalol, hydralazine    Hematology  S/P admn tPA in diff fac w/n last 24 hr bef adm to crnt fac  See primary problem     Other  Tobacco use  Stroke risk factor  Cessation counseling as appropriate          The patient is being Prophylaxed for:  Venous Thromboembolism with: Mechanical  Stress Ulcer with: None  Ventilator Pneumonia with: none    Activity Orders            Diet Adult Regular (IDDSI Level 7) Thin: Regular starting  at 02/27 1527    Turn patient starting at 02/27 1400    Elevate HOB starting at 02/27 1333          Full Code    50 minutes of Critical care time was spent personally by me on the following activities: development of treatment plan with patient or surrogate and bedside caregivers, discussions with consultants, evaluation of patient's response to treatment, examination of patient, ordering and performing treatments and interventions, ordering and review of laboratory studies, ordering and review of radiographic studies, pulse oximetry, antibiotic titration if applicable, vasopressor titration if applicable, re-evaluation of patient's condition. This critical care time did not overlap with that of any other provider or involve time for any procedures. There is high probability for acute neurological change leading to clinical and possibly life-threatening deterioration requiring highest level of provider preparedness for urgent intervention.     Nasreen Chaves PA-C  Neurocritical Care  [facility] Neuro Critical [facility]

## 2024-02-27 NOTE — SUBJECTIVE & OBJECTIVE
Past Medical History:   Diagnosis Date    HTN (hypertension)     HTN (hypertension) 2/27/2024    Tobacco use      Past Surgical History:   Procedure Laterality Date    CYST REMOVAL        No current facility-administered medications on file prior to encounter.     Current Outpatient Medications on File Prior to Encounter   Medication Sig Dispense Refill    albuterol (PROVENTIL/VENTOLIN HFA) 90 mcg/actuation inhaler Inhale 2 puffs into the lungs every 6 (six) hours as needed for Wheezing. Rescue 1 Inhaler 0    benzonatate (TESSALON) 200 MG capsule Take 1 capsule (200 mg total) by mouth 3 (three) times daily as needed for Cough. 30 capsule 0    budesonide 180mcg (PULMICORT 180MCG) 180 mcg/actuation AePB Inhale 2 puffs into the lungs once daily. Controller for 10 days 1 each 0    ondansetron (ZOFRAN-ODT) 8 MG TbDL Take 1 tablet (8 mg total) by mouth every 6 (six) hours as needed. 30 tablet 0    promethazine-dextromethorphan (PROMETHAZINE-DM) 6.25-15 mg/5 mL Syrp Take 5 mLs by mouth nightly as needed. 5 mL every 4 to 6 hours; maximum: 30 mL in 24 hours 118 mL 0      Allergies: Patient has no known allergies.  Family History   Problem Relation Age of Onset    No Known Problems Mother     No Known Problems Father      Social History     Tobacco Use    Smoking status: Every Day     Current packs/day: 0.50     Types: Cigarettes    Smokeless tobacco: Never   Substance Use Topics    Alcohol use: Yes    Drug use: No     Review of Systems   Eyes:  Negative for photophobia and visual disturbance.   Neurological:  Positive for dizziness, facial asymmetry, weakness and headaches. Negative for seizures, speech difficulty, light-headedness and numbness.     Objective:     Vitals:    Temp: 98.2 °F (36.8 °C)  Pulse: 96  Rhythm: normal sinus rhythm  BP: (!) 181/96  MAP (mmHg): 131  Resp: 10  SpO2: 99 %    Temp  Min: 98 °F (36.7 °C)  Max: 98.2 °F (36.8 °C)  Pulse  Min: 70  Max: 96  BP  Min: 133/72  Max: 204/120  MAP (mmHg)  Min: 97   Max: 156  Resp  Min: 10  Max: 20  SpO2  Min: 95 %  Max: 100 %    No intake/output data recorded.            Physical Exam  Constitutional:       Appearance: Normal appearance. He is obese.   HENT:      Head: Normocephalic and atraumatic.   Cardiovascular:      Rate and Rhythm: Normal rate and regular rhythm.   Pulmonary:      Effort: Pulmonary effort is normal. No respiratory distress.   Neurological:      Comments: Sedation: None  E3V5M6  AAOx3  No aphasia, dysarthria  PERRLA, EOMI  Pupils brisk  L facial droop  Noted LUE ataxia  5/5 on R  3/5 on L       Current NIH Stroke Score Values      Flowsheet Row Most Recent Value   Interval shift assessment   1a. Level of Consciousness 1-->Not alert, but arousable by minor stimulation to obey, answer, or respond   1b. LOC Questions 0-->Answers both questions correctly   1c. LOC Commands 0-->Performs both tasks correctly   2. Best Gaze 0-->Normal   3. Visual 0-->No visual loss   4. Facial Palsy 1-->Minor paralysis (flattened nasolabial fold, asymmetry on smiling)   5a. Motor Arm, Left 1-->Drift, limb holds 90 (or 45) degrees, but drifts down before full 10 seconds, does not hit bed or other support   5b. Motor Arm, Right 0-->No drift, limb holds 90 (or 45) degrees for full 10 secs   6a. Motor Leg, Left 1-->Drift, leg falls by the end of the 5-sec period but does not hit bed   6b. Motor Leg, Right 0-->No drift, leg holds 30 degree position for full 5 secs   7. Limb Ataxia 1-->Present in one limb   8. Sensory 0-->Normal, no sensory loss   9. Best Language 0-->No aphasia, normal   10. Dysarthria 0-->Normal   11. Extinction and Inattention (formerly Neglect) 0-->No abnormality   Total (NIH Stroke Scale) 5             Today I personally reviewed pertinent medications, lines/drains/airways, imaging, cardiology results, laboratory results, microbiology results, notably: CT

## 2024-02-27 NOTE — HPI
Kishore Silverio is a 48 year old man with PMH smoking, HTN (not on meds) transferred from [facility] 2/27 for post-TNK monitoring. This morning he experienced acute onset dizziness and left sided weakness while driving to work. [facility] 2/27 0830 when symptoms began. Telestroke NIHSS 7. CTH with no acute abnormality and CTA negative for LVO. Received tenecteplase prior to transfer at 1043.    On arrival, patient reports his speech and left sided weakness have improved. He smokes 1/2 ppd.

## 2024-02-27 NOTE — PLAN OF CARE
[facility] Care Plan    POC reviewed with Kishore Silverio and family at 1400. Pt verbalized understanding. Questions and concerns addressed. No acute events today. Pt progressing toward goals. Will continue to monitor. See below and flowsheets for full assessment and VS info.     -TNK given at 1043  -PRN Hydralazine given x1  -PRN tylenol given for HA  -EEG cap applied    Is this a stroke patient? yes- Stroke booklet reviewed with patient, risk factors identified for patient and stroke booklet remains at bedside for ongoing education.     Neuro:  Rj Coma Scale  Best Eye Response: 4-->(E4) spontaneous  Best Motor Response: 6-->(M6) obeys commands  Best Verbal Response: 5-->(V5) oriented  Rj Coma Scale Score: 15  Assessment Qualifiers: patient not sedated/intubated, no eye obstruction present  Pupil PERRLA: yes     24 hr Temp:  [98 °F (36.7 °C)-98.2 °F (36.8 °C)]     CV:   Rhythm: normal sinus rhythm  BP goals:   SBP < 180  MAP > 65    Resp:           Plan: N/A    GI/:     Diet/Nutrition Received: regular  Last Bowel Movement: 02/27/24       Intake/Output Summary (Last 24 hours) at 2/27/2024 1748  Last data filed at 2/27/2024 1242  Gross per 24 hour   Intake --   Output 450 ml   Net -450 ml          Labs/Accuchecks:  Recent Labs   Lab 02/27/24  0930   WBC 6.98   RBC 5.40   HGB 14.6   HCT 44.0         Recent Labs   Lab 02/27/24  0930      K 4.1   CO2 25      BUN 20   CREATININE 0.9   ALKPHOS 73   ALT 37   AST 21   BILITOT 0.3      Recent Labs   Lab 02/27/24  0930   INR 1.0      Recent Labs   Lab 02/27/24  1517   TROPONINI <0.006       Electrolytes: N/A - electrolytes WDL  Accuchecks: Q6H    Gtts:      LDA/Wounds:    Nurses Note -- 4 Eyes      2/27/2024   5:48 PM      Skin assessed during: Admit    Is there altered skin present? no   [x] No Altered Skin Integrity Present    [x]Prevention Measures Documented

## 2024-02-27 NOTE — ED TRIAGE NOTES
Patient arrives via EMS from [facility] neurology services. Patient received TNK, nothing acute shown on CT scan. Patient LKN 0830 this morning. Pt had left sided hemiplegia and left sided facial droop. Pt AAOx4 w GCS 15.

## 2024-02-27 NOTE — ASSESSMENT & PLAN NOTE
48 year old man with smoking, HTN (not on meds) presented to [facility] 2/27 for acute left sided weakness. Telestroke NIHSS 7. CTH no acute abnormality and CTA negative for LVO. Received TNK prior to transfer. Arrival NIHSS 3 with improvement in LSW. To be admitted to [facility] for post-TNK monitoring.    Antithrombotics for secondary stroke prevention: None; hold for 24 hrs post thrombolytic therapy then start DAPT with ASA 81 mg and clopidogrel 75 mg    Statins for secondary stroke prevention and hyperlipidemia, if present: Statins:     Aggressive risk factor modification: HTN, Smoking, Diet, Exercise     Rehab efforts: The patient has been evaluated by a stroke team provider and the therapy needs have been fully considered based off the presenting complaints and exam findings. The following therapy evaluations are needed: PT evaluate and treat, OT evaluate and treat, SLP evaluate and treat    Diagnostics ordered/pending: HgbA1C 6.3, MRI head without contrast to assess brain parenchyma, TTE to assess cardiac function/status     VTE prophylaxis: None: Reason for No Pharmacological VTE Prophylaxis: Holding x 24 hours s/p treatment with Thrombolytic therapy    BP parameters: Post Thrombolytic therapy, SBP <180

## 2024-02-27 NOTE — ED PROVIDER NOTES
"Encounter Date: 2/27/2024       History     Chief Complaint   Patient presents with    Extremity Weakness     Pt pulled from ED ramp after L sided weakness, L sided facial droop, slurred speech, and neglect approximately 30 minutes pta. Pt reported to wife last PM "horrible and weird HA."     HPI    Patient is a 40-year-old male with a history of hypertension presenting to the ED due to acute onset dizziness began at 830.  Patient did noticed that last night patient had some neck pain.  While he was driving, he experienced the dizziness so he pulled over.  Called wife/friend to come get him.  He arrives today with wife.      Review of patient's allergies indicates:  No Known Allergies  Past Medical History:   Diagnosis Date    HTN (hypertension)     HTN (hypertension) 2/27/2024    Tobacco use      Past Surgical History:   Procedure Laterality Date    CYST REMOVAL       Family History   Problem Relation Age of Onset    No Known Problems Mother     No Known Problems Father      Social History     Tobacco Use    Smoking status: Every Day     Current packs/day: 0.50     Types: Cigarettes    Smokeless tobacco: Never   Substance Use Topics    Alcohol use: Yes    Drug use: No     Physical Exam     Initial Vitals   BP Pulse Resp Temp SpO2   02/27/24 0938 02/27/24 0938 02/27/24 1010 02/27/24 1010 02/27/24 0938   (!) 186/107 75 20 98 °F (36.7 °C) 95 %      MAP       --                Physical Exam    Constitutional: He appears well-developed and well-nourished. He is not diaphoretic. No distress.   HENT:   Head: Normocephalic and atraumatic.   Eyes: Conjunctivae are normal.   Slow EOM, possible lack of leftward gaze   Cardiovascular:  Normal rate, regular rhythm and normal heart sounds.           Pulmonary/Chest: Breath sounds normal. No respiratory distress.   Musculoskeletal:         General: No edema.     Neurological:   Drowsy, easily arousable but falling asleep while still in the room  Left-sided facial droop, reporting " difficulty with opening right eye  On initial presentation, jadon neglect of left side  Later during formal neuro exam, patient with some movement of left side, although grossly weak when compared to right   Skin: Skin is warm and dry.   Psychiatric:   Affect seems slightly off, little expression of concern regarding weakness and inability to walk, could be due to lack of recognition with hemineglect         ED Course   Procedures  Labs Reviewed   URINALYSIS, REFLEX TO URINE CULTURE - Abnormal; Notable for the following components:       Result Value    Specific Gravity, UA >1.030 (*)     All other components within normal limits    Narrative:     Specimen Source->Urine   CBC W/ AUTO DIFFERENTIAL    Narrative:     Release to patient->Immediate   COMPREHENSIVE METABOLIC PANEL    Narrative:     Release to patient->Immediate   PROTIME-INR    Narrative:     Release to patient->Immediate   TSH    Narrative:     Release to patient->Immediate   HIV 1 / 2 ANTIBODY    Narrative:     Release to patient->Immediate   HEPATITIS C ANTIBODY    Narrative:     Release to patient->Immediate   TOXICOLOGY SCREEN, URINE, RANDOM (COMPLIANCE)    Narrative:     Specimen Source->Urine   POCT GLUCOSE, HAND-HELD DEVICE   ISTAT CREATININE   POCT GLUCOSE   POCT GLUCOSE   POCT GLUCOSE MONITORING CONTINUOUS   POCT GLUCOSE MONITORING CONTINUOUS     EKG Readings: (Independently Interpreted)   Rhythm: Normal Sinus Rhythm. Heart Rate: 74. Ectopy: No Ectopy. Conduction: Normal. T Waves Flipped: AVR and V1. Axis: Normal. Clinical Impression: Normal Sinus Rhythm     ECG Results              ECG 12 lead (Final result)        Collection Time Result Time QRS Duration OHS QTC Calculation    02/27/24 10:04:01 02/27/24 14:01:13 78 408                     Final result by Interface, Lab In [facility] (02/27/24 14:01:19)                   Narrative:    Test Reason : R29.818,    Vent. Rate : 074 BPM     Atrial Rate : 074 BPM     P-R Int : 160 ms          QRS Dur :  078 ms      QT Int : 368 ms       P-R-T Axes : 032 046 027 degrees     QTc Int : 408 ms    Normal sinus rhythm  Septal infarct ,age undetermined  Abnormal ECG    Confirmed by Cydney JAMES, Denis RAMIREZ ([id number]) on 2/27/2024 2:01:06 PM    Referred By: MERLINE   SELF           Confirmed By:Denis Lamas MD                                  Imaging Results              CT Head Without Contrast (Final result)  Result time 02/27/24 14:31:57      Final result by Ton Ashley MD (02/27/24 14:31:57)                   Impression:      Questionable focal low-density medial right thalamus that may be ischemic in etiology in light of the history although this may be artifactual nature.  Follow-up versus MR imaging as clinically warranted.      Electronically signed by: Ton Ashley  Date:    02/27/2024  Time:    14:31               Narrative:    EXAMINATION:  CT HEAD WITHOUT CONTRAST    CLINICAL HISTORY:  Stroke, follow up;    TECHNIQUE:  Low dose axial images were obtained through the head.  Coronal and sagittal reformations were also performed. Contrast was not administered.    COMPARISON:  CT 02/27/2024    FINDINGS:  No evidence of hydrocephalus, mass effect, intracranial hemorrhage or acute territorial infarct.    The brain parenchyma maintains normal attenuation other than for questionable focal low-density within the medial right thalamus.    The calvarium is intact.Right maxillary sinus mucosal thickening.                                       CTA Head and Neck (xpd) (Final result)  Result time 02/27/24 10:10:36      Final result by Enrike Saldivar III, MD (02/27/24 10:10:36)                   Impression:      Normal CT of the head without with contrast.    Normal CTA of the head and neck structures.    Sinusitis change.      Electronically signed by: Enrike Saldivar MD  Date:    02/27/2024  Time:    10:10               Narrative:    EXAMINATION:  CTA HEAD AND NECK (XPD)    CLINICAL HISTORY:  Neuro deficit, acute,  stroke suspected;    FINDINGS:  Patient was administered 100 cc of Omnipaque 350 intravenously.  Precontrast images demonstrate no bleed, mass, or mass effect and no evidence of acute infarct changes.  Post-contrast images demonstrate no abnormal enhancement, vascularity, or blood-brain barrier breakdown.  The arch and great vessels are unremarkable.  The vertebral arteries demonstrate no significant abnormality.  Vertebral arteries are small compensated by prominent posterior communicating arteries.  The CCAs and the ICAs are patent and symmetric.  The skull base vessels are unremarkable.  ACAs and branches, MCAs and branches, and PCAs and branches are unremarkable.  Venous structures are normal.  No aneurysm, stenosis, thrombus, or vascular malformation seen.  There is sinusitis change.  No soft tissue mass or adenopathy is seen.  Upper mediastinum and pulmonary vessels are normal.  Bones reveal DJD.                                       Medications   sodium chloride 0.9% flush 10 mL (10 mLs Intravenous Given 2/27/24 1042)   sodium chloride 0.9% flush 10 mL (10 mLs Intravenous Given 2/27/24 1044)   sodium chloride 0.9% flush 10 mL (has no administration in time range)   bisacodyL suppository 10 mg (has no administration in time range)   atorvastatin tablet 40 mg (40 mg Oral Given 2/27/24 1531)   senna-docusate 8.6-50 mg per tablet 1 tablet (1 tablet Oral Not Given 2/27/24 1445)   potassium bicarbonate disintegrating tablet 50 mEq (has no administration in time range)   potassium bicarbonate disintegrating tablet 35 mEq (has no administration in time range)   potassium bicarbonate disintegrating tablet 60 mEq (has no administration in time range)   magnesium oxide split tablet 800 mg (has no administration in time range)   magnesium oxide split tablet 800 mg (has no administration in time range)   potassium, sodium phosphates 280-160-250 mg packet 2 packet (has no administration in time range)   potassium, sodium  phosphates 280-160-250 mg packet 2 packet (has no administration in time range)   potassium, sodium phosphates 280-160-250 mg packet 2 packet (has no administration in time range)   acetaminophen tablet 650 mg (650 mg Oral Given 2/27/24 1531)   hydrALAZINE injection 10 mg (10 mg Intravenous Given 2/27/24 1513)   labetalol 20 mg/4 mL (5 mg/mL) IV syring (has no administration in time range)   iohexoL (OMNIPAQUE 350) injection 100 mL (100 mLs Intravenous Given 2/27/24 0951)   tenecteplase (TNKase) IV KIT 25 mg (25 mg Intravenous Given 2/27/24 1043)     Medical Decision Making  Patient is a 40-year-old male with a history of hypertension presenting to the ED due to acute onset dizziness that began at 830.  Patient arrives with inability to move the left side of his body, presenting with jadon neglect.  Left-sided facial droop.  Stroke code called, patient is sent for a CTA head and neck.  Patient's neuro status started to improve, with some slight movement of the left side, although significantly weaker compared to the right.  Patient unusually sleepy with perceived abnormal affect given the circumstances, UA and UDS ordered to assess for other causes of these neuro deficits, these were normal.  For remainder of patient's labs were also unremarkable.  CTA not revealing any acute findings.  Vascular neurology evaluated the patient very early on in his encounter, due to his symptoms, timing of onset, he was designated a candidate for TNK.  Patient was transferred to [facility].     Amount and/or Complexity of Data Reviewed  Labs: ordered.  Radiology: ordered.    Risk  Prescription drug management.  Decision regarding hospitalization.                                      Clinical Impression:  Final diagnoses:  [R29.818] Acute focal neurological deficit (Primary)          ED Disposition Condition    Admit Stable                Amarilis Osorio DO  Resident  02/27/24 1614

## 2024-02-27 NOTE — CONSULTS
[facility] - Emergency Dept  Vascular Neurology  [facility]  Consult Note    Consults - stroke transfer  Assessment/Plan:     Patient is a 48 y.o. year old male with:    * Acute left-sided weakness  48 year old man with smoking, HTN (not on meds) presented to [facility] 2/27 for acute left sided weakness. Telestroke NIHSS 7. CTH no acute abnormality and CTA negative for LVO. Received TNK prior to transfer. Arrival NIHSS 3 with improvement in LSW. To be admitted to [facility] for post-TNK monitoring.    Antithrombotics for secondary stroke prevention: None; hold for 24 hrs post thrombolytic therapy then start DAPT with ASA 81 mg and clopidogrel 75 mg    Statins for secondary stroke prevention and hyperlipidemia, if present: Statins: LDL pending; not on statin prior to admission    Aggressive risk factor modification: HTN, Smoking, Diet, Exercise     Rehab efforts: The patient has been evaluated by a stroke team provider and the therapy needs have been fully considered based off the presenting complaints and exam findings. The following therapy evaluations are needed: PT evaluate and treat, OT evaluate and treat, SLP evaluate and treat    Diagnostics ordered/pending: HgbA1C to assess blood glucose levels, MRI head without contrast to assess brain parenchyma, TTE to assess cardiac function/status     VTE prophylaxis: None: Reason for No Pharmacological VTE Prophylaxis: Holding x 24 hours s/p treatment with Thrombolytic therapy    BP parameters: Post Thrombolytic therapy, SBP <180        HTN (hypertension)  Stroke risk factor. Patient reports history of, however not on meds prior to admission.  - goal SBP <180 after thrombolytic therapy    Tobacco use  Stroke risk factor. Active smoker.  - counseled on cessation  - will need smoking cessation referral on discharge        STROKE DOCUMENTATION     Acute Stroke Times   Last Known Normal Date: 02/27/24  Last Known Normal Time: 0830  Symptom Onset Date:  02/27/24  Symptom Onset Time: 0830  Stroke Team Called Date: 02/27/24  Stroke Team Called Time: 1211  Stroke Team Arrival Date: 02/27/24  Stroke Team Arrival Time: 1216  CT Interpretation Time:  (prior to arrival)  Thrombolytic Therapy Recommended: Yes  CTA Interpretation Time:  (prior to arrival)  Thrombectomy Recommended: No  Decision to Treat Time for Tenecteplase:  (prior to arrival)    NIH Scale:  1a. Level of Consciousness: 0-->Alert, keenly responsive  1b. LOC Questions: 0-->Answers both questions correctly  1c. LOC Commands: 0-->Performs both tasks correctly  2. Best Gaze: 0-->Normal  3. Visual: 0-->No visual loss  4. Facial Palsy: 2-->Partial paralysis (total or near-total paralysis of lower face)  5a. Motor Arm, Left: 0-->No drift, limb holds 90 (or 45) degrees for full 10 secs  5b. Motor Arm, Right: 0-->No drift, limb holds 90 (or 45) degrees for full 10 secs  6a. Motor Leg, Left: 0-->No drift, leg holds 30 degree position for full 5 secs  6b. Motor Leg, Right: 0-->No drift, leg holds 30 degree position for full 5 secs  7. Limb Ataxia: 0-->Absent  8. Sensory: 0-->Normal, no sensory loss  9. Best Language: 0-->No aphasia, normal  10. Dysarthria: 1-->Mild-to-moderate dysarthria, patient slurs at least some words and, at worst, can be understood with some difficulty  11. Extinction and Inattention (formerly Neglect): 0-->No abnormality  Total (NIH Stroke Scale): 3    Baseline Modified Letitia Score: 0      Thrombolysis Candidate? Yes, given prior to arrival at outside hospital    Delays to Thrombolysis?  No    Interventional Revascularization Candidate?   Is the patient eligible for mechanical endovascular reperfusion (SUDHA)?  No; No large vessel occlusion identified on imaging     Delays to Thrombectomy? Not Applicable    Hemorrhagic change of an Ischemic Stroke: Does this patient have an ischemic stroke with hemorrhagic changes? No     Subjective:     History of Present Illness:  Kishore Silverio is a 48 year  old man with PMH smoking, HTN (not on meds) transferred from [facility] 2/27 for post-TNK monitoring. This morning he experienced acute onset dizziness and left sided weakness while driving to work. W 2/27 0830 when symptoms began. Telestroke NIHSS 7. CTH with no acute abnormality and CTA negative for LVO. Received tenecteplase prior to transfer at 1043.    On arrival, patient reports his speech and left sided weakness have improved. He smokes 1/2 ppd.          Past Medical History:   Diagnosis Date    HTN (hypertension)     Tobacco use      Past Surgical History:   Procedure Laterality Date    CYST REMOVAL       Social History     Tobacco Use    Smoking status: Every Day     Types: Cigarettes    Smokeless tobacco: Never   Substance Use Topics    Alcohol use: Yes    Drug use: No     Review of patient's allergies indicates:  No Known Allergies    Medications: I have reviewed the current medication administration record.    (Not in a hospital admission)      Review of Systems   Constitutional:  Negative for diaphoresis and fever.   HENT:  Negative for nosebleeds and rhinorrhea.    Eyes:  Negative for redness and itching.   Respiratory:  Negative for cough and stridor.    Gastrointestinal:  Negative for abdominal distention and vomiting.   Genitourinary:  Negative for difficulty urinating and hematuria.   Musculoskeletal:  Negative for joint swelling and neck stiffness.   Neurological:  Positive for facial asymmetry, speech difficulty and weakness. Negative for numbness.     Objective:     Vital Signs (Most Recent):  Temp: 98 °F (36.7 °C) (02/27/24 1058)  Pulse: 74 (02/27/24 1302)  Resp: 20 (02/27/24 1302)  BP: (!) 148/85 (02/27/24 1302)  SpO2: 96 % (02/27/24 1302)    Vital Signs Range (Last 24H):  Temp:  [98 °F (36.7 °C)]   Pulse:  [70-89]   Resp:  [16-20]   BP: (148-198)/()   SpO2:  [95 %-100 %]        Physical Exam  Vitals and nursing note reviewed.   Constitutional:       General: He is not in acute  distress.  HENT:      Head: Normocephalic and atraumatic.      Nose: Nose normal. No rhinorrhea.   Eyes:      General:         Right eye: No discharge.         Left eye: No discharge.      Conjunctiva/sclera: Conjunctivae normal.   Cardiovascular:      Rate and Rhythm: Normal rate.   Pulmonary:      Effort: Pulmonary effort is normal. No respiratory distress.   Musculoskeletal:         General: No tenderness or deformity.   Skin:     General: Skin is warm and dry.      Capillary Refill: Capillary refill takes less than 2 seconds.   Neurological:      Mental Status: He is alert.              Neurological Exam:   LOC: alert  Attention Span: Good   Language: No aphasia  Articulation: Dysarthria  EOM (CN III, IV, VI): Full/intact, slight dysconjugate gaze  Facial Sensation (CN V): Normal  Facial Movement (CN VII): Lower facial weakness on the Left  Motor: Arm left  Normal 5/5  Leg left  Normal 5/5  Arm right  Normal 5/5  Leg right Normal 5/5  Cerebellum: normal finger to nose bilaterally  Sensation: light touch intact and symmetric in BUE and BLE      Laboratory:  CMP:   Recent Labs   Lab 02/27/24  0930   CALCIUM 9.5   ALBUMIN 4.1   PROT 7.3      K 4.1   CO2 25      BUN 20   CREATININE 0.9   ALKPHOS 73   ALT 37   AST 21   BILITOT 0.3     CBC:   Recent Labs   Lab 02/27/24  0930   WBC 6.98   RBC 5.40   HGB 14.6   HCT 44.0      MCV 82   MCH 27.0   MCHC 33.2       Diagnostic Results:  Brain/Vessel imaging:  CTA Stroke Multiphase 2/27/24  Normal CT of the head without with contrast. Normal CTA of the head and neck structures. Sinusitis change.    Cardiac Evaluation:   EKG 2/27/24 NSR      Peri Olsen MD  [facility]  Department of Vascular Neurology   [facility] - Emergency Dept

## 2024-02-27 NOTE — NURSING
Patient arrived to [facility] from ED via bed     Report received from: ED RN,      Type of stroke/diagnosis:  post tnk    Tenecteplase start and end time (if applicable) 1043    Current symptoms: AAOx4, moves spont, LSW, slight LFD, pupils 2 brisk    Skin Assessment done: Y  Wounds noted: none  *If wounds noted, was Wound Care consulted? Y/N  *If wounds noted, LDA placed? Y/N  Skin Assessment Verified by:      Selvin Completed? pending    Patient Belongings on Admit: none    NCC notified: JACKI Chaves

## 2024-02-27 NOTE — ASSESSMENT & PLAN NOTE
48 year old man with smoking, HTN (not on meds) presented to [facility] 2/27 for acute left sided weakness. Telestroke NIHSS 7. CTH no acute abnormality and CTA negative for LVO. Received TNK prior to transfer. Arrival NIHSS 3 with improvement in LSW. To be admitted to [facility] for post-TNK monitoring.    Antithrombotics for secondary stroke prevention: None; hold for 24 hrs post thrombolytic therapy    Statins for secondary stroke prevention and hyperlipidemia, if present: Statins: LDL pending; not on statin prior to admission    Aggressive risk factor modification: HTN, Smoking, Diet, Exercise     Rehab efforts: The patient has been evaluated by a stroke team provider and the therapy needs have been fully considered based off the presenting complaints and exam findings. The following therapy evaluations are needed: PT evaluate and treat, OT evaluate and treat, SLP evaluate and treat    Diagnostics ordered/pending: HgbA1C to assess blood glucose levels, MRI head without contrast to assess brain parenchyma, TTE to assess cardiac function/status     VTE prophylaxis: None: Reason for No Pharmacological VTE Prophylaxis: Holding x 24 hours s/p treatment with Thrombolytic therapy    BP parameters: Post Thrombolytic therapy, SBP <180

## 2024-02-27 NOTE — ED PROVIDER NOTES
"Encounter Date: 2/27/2024       History     Chief Complaint   Patient presents with    Extremity Weakness     Pt pulled from ED ramp after L sided weakness, L sided facial droop, slurred speech, and neglect approximately 30 minutes pta. Pt reported to wife last PM "horrible and weird HA."     HPI  48-year-old male who presents has a transfer after getting TNK an outside hospital.  Per EMS, he felt well this morning but developed dizziness while driving to work.  Last known well was at 8:30 a.m..  Upon arrival to an outside hospital he developed left facial droop and left hemiparesis.  He also developed some speech changes as well.  He had a CTA head and neck that did not show any LDL.  He was given TNK about an hour ago.  He did have improvement of his left-sided arm and leg weakness, though not total resolution.  Still has a facial droop.  He reports he feels sleepy but other than that denies any headaches.  No confusion.  Was not given any medications per EMS.      Review of patient's allergies indicates:  No Known Allergies  History reviewed. No pertinent past medical history.  Past Surgical History:   Procedure Laterality Date    CYST REMOVAL       Family History   Problem Relation Age of Onset    No Known Problems Mother     No Known Problems Father      Social History     Tobacco Use    Smoking status: Never    Smokeless tobacco: Never   Substance Use Topics    Alcohol use: Yes    Drug use: No     Review of Systems    Physical Exam     Initial Vitals   BP Pulse Resp Temp SpO2   02/27/24 0938 02/27/24 0938 02/27/24 1010 02/27/24 1010 02/27/24 0938   (!) 186/107 75 20 98 °F (36.7 °C) 95 %      MAP       --                Physical Exam    Nursing note and vitals reviewed.  Constitutional: No distress.   Awake, slightly sleepy but is conversant and following commands easily   HENT:   Head: Normocephalic and atraumatic.   Nose: Nose normal.   Left upper and lower facial droop   Eyes: Conjunctivae and EOM are " normal. Pupils are equal, round, and reactive to light.   Neck:   Normal range of motion.  Cardiovascular:  Normal rate, regular rhythm and normal heart sounds.     Exam reveals no gallop and no friction rub.       No murmur heard.  Pulmonary/Chest: Breath sounds normal. No respiratory distress. He has no wheezes. He has no rhonchi. He has no rales.   Abdominal: Abdomen is soft. He exhibits no distension. There is no abdominal tenderness.   Musculoskeletal:         General: No tenderness or edema. Normal range of motion.      Cervical back: Normal range of motion.     Neurological: He is alert.   Oriented x3   Left-sided upper and lower facial droop   EOMI   Visual fields intact   Slight decrease in strength in the left upper extremity and mild drift in the left upper extremity  Right upper extremity with normal strength   Mild weakness in the left lower extremity in comparison to the right   Sensation intact in all extremities   Normal finger-to-nose bilaterally  Mild dysarthria, no aphasia  Follows commands and answers questions appropriately   Skin: Skin is warm and dry. Capillary refill takes less than 2 seconds.         ED Course   Critical Care    Date/Time: 2/27/2024 12:28 PM    Performed by: Sally Gorman MD  Authorized by: Sally Gorman MD  Direct patient critical care time: 20 minutes  Additional history critical care time: 10 minutes  Ordering / reviewing critical care time: 5 minutes  Documentation critical care time: 5 minutes  Consulting other physicians critical care time: 5 minutes  Consult with family critical care time: 5 minutes  Total critical care time (exclusive of procedural time) : 50 minutes  Critical care was necessary to treat or prevent imminent or life-threatening deterioration of the following conditions: CNS failure or compromise.  Critical care was time spent personally by me on the following activities: re-evaluation of patient's condition, review of old charts, examination of  patient, evaluation of patient's response to treatment, discussions with consultants, development of treatment plan with patient or surrogate, obtaining history from patient or surrogate, ordering and performing treatments and interventions, ordering and review of laboratory studies and ordering and review of radiographic studies.        Labs Reviewed   URINALYSIS, REFLEX TO URINE CULTURE - Abnormal; Notable for the following components:       Result Value    Specific Gravity, UA >1.030 (*)     All other components within normal limits    Narrative:     Specimen Source->Urine   CBC W/ AUTO DIFFERENTIAL    Narrative:     Release to patient->Immediate   COMPREHENSIVE METABOLIC PANEL    Narrative:     Release to patient->Immediate   PROTIME-INR    Narrative:     Release to patient->Immediate   TSH    Narrative:     Release to patient->Immediate   HIV 1 / 2 ANTIBODY    Narrative:     Release to patient->Immediate   HEPATITIS C ANTIBODY    Narrative:     Release to patient->Immediate   TOXICOLOGY SCREEN, URINE, RANDOM (COMPLIANCE)    Narrative:     Specimen Source->Urine   LIPID PANEL   POCT GLUCOSE, HAND-HELD DEVICE   ISTAT CREATININE   POCT GLUCOSE   POCT GLUCOSE MONITORING CONTINUOUS          Imaging Results              CTA Head and Neck (xpd) (Final result)  Result time 02/27/24 10:10:36      Final result by Enrike Saldivar III, MD (02/27/24 10:10:36)                   Impression:      Normal CT of the head without with contrast.    Normal CTA of the head and neck structures.    Sinusitis change.      Electronically signed by: Enrike Saldivar MD  Date:    02/27/2024  Time:    10:10               Narrative:    EXAMINATION:  CTA HEAD AND NECK (XPD)    CLINICAL HISTORY:  Neuro deficit, acute, stroke suspected;    FINDINGS:  Patient was administered 100 cc of Omnipaque 350 intravenously.  Precontrast images demonstrate no bleed, mass, or mass effect and no evidence of acute infarct changes.  Post-contrast images  demonstrate no abnormal enhancement, vascularity, or blood-brain barrier breakdown.  The arch and great vessels are unremarkable.  The vertebral arteries demonstrate no significant abnormality.  Vertebral arteries are small compensated by prominent posterior communicating arteries.  The CCAs and the ICAs are patent and symmetric.  The skull base vessels are unremarkable.  ACAs and branches, MCAs and branches, and PCAs and branches are unremarkable.  Venous structures are normal.  No aneurysm, stenosis, thrombus, or vascular malformation seen.  There is sinusitis change.  No soft tissue mass or adenopathy is seen.  Upper mediastinum and pulmonary vessels are normal.  Bones reveal DJD.                                       Medications   sodium chloride 0.9% flush 10 mL (10 mLs Intravenous Given 2/27/24 1042)   sodium chloride 0.9% flush 10 mL (10 mLs Intravenous Given 2/27/24 1044)   iohexoL (OMNIPAQUE 350) injection 100 mL (100 mLs Intravenous Given 2/27/24 0951)   tenecteplase (TNKase) IV KIT 25 mg (25 mg Intravenous Given 2/27/24 1043)     Medical Decision Making  48-year-old male who presents as a transfer.  Last known well 8:30 a.m..  Had left-sided hemiparesis, facial droop, dysarthria on arrival.  No LDL.  Given TNK.  Currently feels sleepy and is slightly sleepy on exam but not obtunded or truly somnolent.  Neuro exam shows left-sided facial droop, mild dysarthria, mild left arm and leg weakness.  Blood pressure is elevated does on initial and repeat so we will start nicardipine for blood pressure control, less than 180s/105.  Neuro critical care consulted and will be down to see the patient for admission.  Vascular neurology consulted and down to see the patient as well.  I will order repeat head CT just because of the sleepiness and recent TNK.  Otherwise rest of managing per primary team for      Amount and/or Complexity of Data Reviewed  Labs: ordered.  Radiology: ordered.    Risk  Prescription drug  management.                                      Clinical Impression:  Final diagnoses:  [R29.818] Acute focal neurological deficit          ED Disposition Condition    Transfer to Another Facility Stable                Sally Gorman MD  02/27/24 3351

## 2024-02-27 NOTE — ASSESSMENT & PLAN NOTE
47 y/o M presents to NCC with acute LSW. CT negative for acute abnormality, CTA negative for LVO. TNK administered at 1043 on 2/27, with improvement in NIH from 7 to 3 per stroke team documentation. CT repeated in ED due to waxing/waning wakefulness with questionable hypodensity in R thalamus. NIH 5 in NCC. Stroke team aware of change.    -Admit to NCC, stroke team following  -q1h neuro checks, vital checks  -EKG, ECHO, CXR  -A1c, TSH, lipid panel  -Daily CBC, CMP, mag, phos  -SBP < 180, prn labetalol and hydralazine  -Statin  -SCDs, Hold ACAP in acute post TNK setting  -PT/OT/SLP  -MRI pending, EEG cap ordered to r/o seizures in setting of waxing/waning exam

## 2024-02-27 NOTE — HPI
Mr. Chapincito Silverio is a 48 year old man with a PMHx of smoking and HTN who presents to [facility] s/p TNK administration. He initially presented to [facility] 2/27 for acute left sided weakness. Telestroke NIHSS 7. CTH no acute abnormality and CTA negative for LVO. Received TNK prior to transfer, end time 1030. Arrival NIHSS 3 with improvement in LSW. CT head was repeated upon arrival to [facility] ED out of concern for diminished wakefulness, which displayed questionable R thalamic hypodensity. He will be admitted to [facility] for hourly neuro-monitoring and a higher level of care.

## 2024-02-27 NOTE — ED NOTES
[facility] arrived to transport patient. Pt AAO x4 but still remains drowsy at this time. Wife remains at bedside with pt belongings. Pt calm and agreeable to transfer.

## 2024-02-27 NOTE — ASSESSMENT & PLAN NOTE
Stroke risk factor. Active smoker.  - counseled on cessation  - will need smoking cessation referral on discharge

## 2024-02-27 NOTE — ASSESSMENT & PLAN NOTE
Stroke risk factor. Patient reports history of, however not on meds prior to admission.  - goal SBP <180 after thrombolytic therapy

## 2024-02-27 NOTE — SUBJECTIVE & OBJECTIVE
Past Medical History:   Diagnosis Date    HTN (hypertension)     Tobacco use      Past Surgical History:   Procedure Laterality Date    CYST REMOVAL       Social History     Tobacco Use    Smoking status: Every Day     Types: Cigarettes    Smokeless tobacco: Never   Substance Use Topics    Alcohol use: Yes    Drug use: No     Review of patient's allergies indicates:  No Known Allergies    Medications: I have reviewed the current medication administration record.    (Not in a hospital admission)      Review of Systems   Constitutional:  Negative for diaphoresis and fever.   HENT:  Negative for nosebleeds and rhinorrhea.    Eyes:  Negative for redness and itching.   Respiratory:  Negative for cough and stridor.    Gastrointestinal:  Negative for abdominal distention and vomiting.   Genitourinary:  Negative for difficulty urinating and hematuria.   Musculoskeletal:  Negative for joint swelling and neck stiffness.   Neurological:  Positive for facial asymmetry, speech difficulty and weakness. Negative for numbness.     Objective:     Vital Signs (Most Recent):  Temp: 98 °F (36.7 °C) (02/27/24 1058)  Pulse: 74 (02/27/24 1302)  Resp: 20 (02/27/24 1302)  BP: (!) 148/85 (02/27/24 1302)  SpO2: 96 % (02/27/24 1302)    Vital Signs Range (Last 24H):  Temp:  [98 °F (36.7 °C)]   Pulse:  [70-89]   Resp:  [16-20]   BP: (148-198)/()   SpO2:  [95 %-100 %]        Physical Exam  Vitals and nursing note reviewed.   Constitutional:       General: He is not in acute distress.  HENT:      Head: Normocephalic and atraumatic.      Nose: Nose normal. No rhinorrhea.   Eyes:      General:         Right eye: No discharge.         Left eye: No discharge.      Conjunctiva/sclera: Conjunctivae normal.   Cardiovascular:      Rate and Rhythm: Normal rate.   Pulmonary:      Effort: Pulmonary effort is normal. No respiratory distress.   Musculoskeletal:         General: No tenderness or deformity.   Skin:     General: Skin is warm and dry.       Capillary Refill: Capillary refill takes less than 2 seconds.   Neurological:      Mental Status: He is alert.              Neurological Exam:   LOC: alert  Attention Span: Good   Language: No aphasia  Articulation: Dysarthria  EOM (CN III, IV, VI): Full/intact  Facial Sensation (CN V): Normal  Facial Movement (CN VII): Lower facial weakness on the Left  Motor: Arm left  Normal 5/5  Leg left  Normal 5/5  Arm right  Normal 5/5  Leg right Normal 5/5  Cerebellum: normal finger to nose bilaterally  Sensation: light touch intact and symmetric in BUE and BLE      Laboratory:  CMP:   Recent Labs   Lab 02/27/24  0930   CALCIUM 9.5   ALBUMIN 4.1   PROT 7.3      K 4.1   CO2 25      BUN 20   CREATININE 0.9   ALKPHOS 73   ALT 37   AST 21   BILITOT 0.3     CBC:   Recent Labs   Lab 02/27/24  0930   WBC 6.98   RBC 5.40   HGB 14.6   HCT 44.0      MCV 82   MCH 27.0   MCHC 33.2       Diagnostic Results:  Brain/Vessel imaging:  CTA Stroke Multiphase 2/27/24  Normal CT of the head without with contrast. Normal CTA of the head and neck structures. Sinusitis change.    Cardiac Evaluation:   EKG 2/27/24 NSR

## 2024-02-28 PROBLEM — I63.331 CEREBROVASCULAR ACCIDENT (CVA) DUE TO THROMBOSIS OF RIGHT POSTERIOR CEREBRAL ARTERY: Status: ACTIVE | Noted: 2024-02-28

## 2024-02-28 PROBLEM — I16.1 HYPERTENSIVE EMERGENCY: Status: ACTIVE | Noted: 2024-02-28

## 2024-02-28 PROBLEM — I69.959 HEMIPARESIS AS LATE EFFECT OF CEREBROVASCULAR DISEASE: Status: ACTIVE | Noted: 2024-02-28

## 2024-02-28 PROBLEM — I63.81 RIGHT THALAMIC INFARCTION: Status: ACTIVE | Noted: 2024-02-28

## 2024-02-28 LAB
ALBUMIN SERPL BCP-MCNC: 3.8 G/DL (ref 3.5–5.2)
ALP SERPL-CCNC: 68 U/L (ref 55–135)
ALT SERPL W/O P-5'-P-CCNC: 37 U/L (ref 10–44)
ANION GAP SERPL CALC-SCNC: 11 MMOL/L (ref 8–16)
ASCENDING AORTA: 2.82 CM
AST SERPL-CCNC: 19 U/L (ref 10–40)
AV INDEX (PROSTH): 0.61
AV MEAN GRADIENT: 3 MMHG
AV PEAK GRADIENT: 4 MMHG
AV VALVE AREA BY VELOCITY RATIO: 2.42 CM²
AV VALVE AREA: 1.99 CM²
AV VELOCITY RATIO: 0.75
BASOPHILS # BLD AUTO: 0.04 K/UL (ref 0–0.2)
BASOPHILS NFR BLD: 0.4 % (ref 0–1.9)
BILIRUB SERPL-MCNC: 0.4 MG/DL (ref 0.1–1)
BSA FOR ECHO PROCEDURE: 2.22 M2
BUN SERPL-MCNC: 15 MG/DL (ref 6–20)
CALCIUM SERPL-MCNC: 9.3 MG/DL (ref 8.7–10.5)
CHLORIDE SERPL-SCNC: 105 MMOL/L (ref 95–110)
CO2 SERPL-SCNC: 24 MMOL/L (ref 23–29)
CREAT SERPL-MCNC: 0.9 MG/DL (ref 0.5–1.4)
CV ECHO LV RWT: 0.33 CM
DIFFERENTIAL METHOD BLD: ABNORMAL
DOP CALC AO PEAK VEL: 1.03 M/S
DOP CALC AO VTI: 21.96 CM
DOP CALC LVOT AREA: 3.2 CM2
DOP CALC LVOT DIAMETER: 2.03 CM
DOP CALC LVOT PEAK VEL: 0.77 M/S
DOP CALC LVOT STROKE VOLUME: 43.64 CM3
DOP CALCLVOT PEAK VEL VTI: 13.49 CM
E WAVE DECELERATION TIME: 207.84 MSEC
E/A RATIO: 0.82
E/E' RATIO: 4.95 M/S
ECHO LV POSTERIOR WALL: 0.77 CM (ref 0.6–1.1)
EOSINOPHIL # BLD AUTO: 0.1 K/UL (ref 0–0.5)
EOSINOPHIL NFR BLD: 1.4 % (ref 0–8)
ERYTHROCYTE [DISTWIDTH] IN BLOOD BY AUTOMATED COUNT: 13.6 % (ref 11.5–14.5)
EST. GFR  (NO RACE VARIABLE): >60 ML/MIN/1.73 M^2
FRACTIONAL SHORTENING: 28 % (ref 28–44)
GLUCOSE SERPL-MCNC: 167 MG/DL (ref 70–110)
HCT VFR BLD AUTO: 46.9 % (ref 40–54)
HGB BLD-MCNC: 15.5 G/DL (ref 14–18)
IMM GRANULOCYTES # BLD AUTO: 0.02 K/UL (ref 0–0.04)
IMM GRANULOCYTES NFR BLD AUTO: 0.2 % (ref 0–0.5)
INTERVENTRICULAR SEPTUM: 1.22 CM (ref 0.6–1.1)
LA MAJOR: 4.76 CM
LA WIDTH: 3.26 CM
LEFT ATRIUM SIZE: 2.83 CM
LEFT INTERNAL DIMENSION IN SYSTOLE: 3.39 CM (ref 2.1–4)
LEFT VENTRICLE DIASTOLIC VOLUME INDEX: 47.93 ML/M2
LEFT VENTRICLE DIASTOLIC VOLUME: 104.01 ML
LEFT VENTRICLE MASS INDEX: 76 G/M2
LEFT VENTRICLE SYSTOLIC VOLUME INDEX: 21.7 ML/M2
LEFT VENTRICLE SYSTOLIC VOLUME: 46.99 ML
LEFT VENTRICULAR INTERNAL DIMENSION IN DIASTOLE: 4.73 CM (ref 3.5–6)
LEFT VENTRICULAR MASS: 165.04 G
LV LATERAL E/E' RATIO: 4.7 M/S
LV SEPTAL E/E' RATIO: 5.22 M/S
LYMPHOCYTES # BLD AUTO: 1.5 K/UL (ref 1–4.8)
LYMPHOCYTES NFR BLD: 16.9 % (ref 18–48)
MAGNESIUM SERPL-MCNC: 2 MG/DL (ref 1.6–2.6)
MCH RBC QN AUTO: 27.1 PG (ref 27–31)
MCHC RBC AUTO-ENTMCNC: 33 G/DL (ref 32–36)
MCV RBC AUTO: 82 FL (ref 82–98)
MONOCYTES # BLD AUTO: 0.5 K/UL (ref 0.3–1)
MONOCYTES NFR BLD: 5.9 % (ref 4–15)
MV PEAK A VEL: 0.57 M/S
MV PEAK E VEL: 0.47 M/S
MV STENOSIS PRESSURE HALF TIME: 60.27 MS
MV VALVE AREA P 1/2 METHOD: 3.65 CM2
NEUTROPHILS # BLD AUTO: 6.7 K/UL (ref 1.8–7.7)
NEUTROPHILS NFR BLD: 75.2 % (ref 38–73)
NRBC BLD-RTO: 0 /100 WBC
PHOSPHATE SERPL-MCNC: 3 MG/DL (ref 2.7–4.5)
PLATELET # BLD AUTO: 265 K/UL (ref 150–450)
PMV BLD AUTO: 10.5 FL (ref 9.2–12.9)
POCT GLUCOSE: 135 MG/DL (ref 70–110)
POCT GLUCOSE: 159 MG/DL (ref 70–110)
POTASSIUM SERPL-SCNC: 3.8 MMOL/L (ref 3.5–5.1)
PROT SERPL-MCNC: 7 G/DL (ref 6–8.4)
RA MAJOR: 4.23 CM
RA WIDTH: 3.03 CM
RBC # BLD AUTO: 5.72 M/UL (ref 4.6–6.2)
RIGHT VENTRICULAR END-DIASTOLIC DIMENSION: 2.92 CM
SINUS: 2.93 CM
SODIUM SERPL-SCNC: 140 MMOL/L (ref 136–145)
STJ: 2.94 CM
TDI LATERAL: 0.1 M/S
TDI SEPTAL: 0.09 M/S
TDI: 0.1 M/S
WBC # BLD AUTO: 8.98 K/UL (ref 3.9–12.7)
Z-SCORE OF LEFT VENTRICULAR DIMENSION IN END DIASTOLE: -4.17
Z-SCORE OF LEFT VENTRICULAR DIMENSION IN END SYSTOLE: -2.01

## 2024-02-28 PROCEDURE — 83735 ASSAY OF MAGNESIUM: CPT

## 2024-02-28 PROCEDURE — 25000003 PHARM REV CODE 250: Performed by: PHYSICIAN ASSISTANT

## 2024-02-28 PROCEDURE — 25000003 PHARM REV CODE 250

## 2024-02-28 PROCEDURE — 97530 THERAPEUTIC ACTIVITIES: CPT

## 2024-02-28 PROCEDURE — 99233 SBSQ HOSP IP/OBS HIGH 50: CPT | Mod: ,,, | Performed by: PHYSICIAN ASSISTANT

## 2024-02-28 PROCEDURE — 94761 N-INVAS EAR/PLS OXIMETRY MLT: CPT

## 2024-02-28 PROCEDURE — 97165 OT EVAL LOW COMPLEX 30 MIN: CPT

## 2024-02-28 PROCEDURE — 80053 COMPREHEN METABOLIC PANEL: CPT

## 2024-02-28 PROCEDURE — 97161 PT EVAL LOW COMPLEX 20 MIN: CPT

## 2024-02-28 PROCEDURE — 85025 COMPLETE CBC W/AUTO DIFF WBC: CPT

## 2024-02-28 PROCEDURE — 63600175 PHARM REV CODE 636 W HCPCS: Performed by: PHYSICIAN ASSISTANT

## 2024-02-28 PROCEDURE — 11000001 HC ACUTE MED/SURG PRIVATE ROOM

## 2024-02-28 PROCEDURE — 99233 SBSQ HOSP IP/OBS HIGH 50: CPT | Mod: ,,, | Performed by: PSYCHIATRY & NEUROLOGY

## 2024-02-28 PROCEDURE — 84100 ASSAY OF PHOSPHORUS: CPT

## 2024-02-28 RX ORDER — ASPIRIN 81 MG/1
81 TABLET ORAL DAILY
Status: DISCONTINUED | OUTPATIENT
Start: 2024-02-28 | End: 2024-02-29 | Stop reason: HOSPADM

## 2024-02-28 RX ORDER — CLOPIDOGREL BISULFATE 75 MG/1
75 TABLET ORAL DAILY
Status: DISCONTINUED | OUTPATIENT
Start: 2024-02-28 | End: 2024-02-29 | Stop reason: HOSPADM

## 2024-02-28 RX ORDER — HEPARIN SODIUM 5000 [USP'U]/ML
5000 INJECTION, SOLUTION INTRAVENOUS; SUBCUTANEOUS EVERY 8 HOURS
Status: DISCONTINUED | OUTPATIENT
Start: 2024-02-28 | End: 2024-02-29 | Stop reason: HOSPADM

## 2024-02-28 RX ORDER — TALC
6 POWDER (GRAM) TOPICAL NIGHTLY PRN
Status: DISCONTINUED | OUTPATIENT
Start: 2024-02-28 | End: 2024-02-29 | Stop reason: HOSPADM

## 2024-02-28 RX ADMIN — ATORVASTATIN CALCIUM 40 MG: 40 TABLET, FILM COATED ORAL at 09:02

## 2024-02-28 RX ADMIN — HEPARIN SODIUM 5000 UNITS: 5000 INJECTION INTRAVENOUS; SUBCUTANEOUS at 02:02

## 2024-02-28 RX ADMIN — HEPARIN SODIUM 5000 UNITS: 5000 INJECTION INTRAVENOUS; SUBCUTANEOUS at 09:02

## 2024-02-28 RX ADMIN — CLOPIDOGREL BISULFATE 75 MG: 75 TABLET ORAL at 02:02

## 2024-02-28 RX ADMIN — ACETAMINOPHEN 650 MG: 325 TABLET ORAL at 05:02

## 2024-02-28 RX ADMIN — ASPIRIN 81 MG: 81 TABLET, COATED ORAL at 11:02

## 2024-02-28 NOTE — PROGRESS NOTES
[facility] - Neuro Critical Care  Vascular Neurology  Comprehensive Stroke Center  Progress Note    Assessment/Plan:     * Acute left-sided weakness  48 year old man with smoking, HTN (not on meds) presented to [facility] 2/27 for acute left sided weakness. Telestroke NIHSS 7. CTH no acute abnormality and CTA negative for LVO. Received TNK prior to transfer. Arrival NIHSS 3 with improvement in LSW. To be admitted to [facility] for post-TNK monitoring.    Antithrombotics for secondary stroke prevention: None; hold for 24 hrs post thrombolytic therapy then start DAPT with ASA 81 mg and clopidogrel 75 mg    Statins for secondary stroke prevention and hyperlipidemia, if present: Statins:     Aggressive risk factor modification: HTN, Smoking, Diet, Exercise     Rehab efforts: The patient has been evaluated by a stroke team provider and the therapy needs have been fully considered based off the presenting complaints and exam findings. The following therapy evaluations are needed: PT evaluate and treat, OT evaluate and treat, SLP evaluate and treat    Diagnostics ordered/pending: HgbA1C 6.3, MRI head without contrast to assess brain parenchyma, TTE to assess cardiac function/status     VTE prophylaxis: None: Reason for No Pharmacological VTE Prophylaxis: Holding x 24 hours s/p treatment with Thrombolytic therapy    BP parameters: Post Thrombolytic therapy, SBP <180        HTN (hypertension)  Stroke risk factor. Patient reports history of, however not on meds prior to admission.  - goal SBP <180 after thrombolytic therapy    Tobacco use  Stroke risk factor. Active smoker.  - counseled on cessation  - will need smoking cessation referral on discharge         48 YOM w/HTN and tobacco use presented to [facility] 2/27 for acute L sided weakness (body and facial) and dizziness. Came in for TNK. Admitted to [facility] for post TNK monitoring. Amenable to quitting smoking, educated on smoking cessation.     STROKE DOCUMENTATION   Acute Stroke  Times   Last Known Normal Date: 02/27/24  Last Known Normal Time: 0830  Symptom Onset Date: 02/27/24  Symptom Onset Time: 0830  Stroke Team Called Date: 02/27/24  Stroke Team Called Time: 1211  Stroke Team Arrival Date: 02/27/24  Stroke Team Arrival Time: 1216  CT Interpretation Time:  (prior to arrival)  Thrombolytic Therapy Recommended: Yes  CTA Interpretation Time:  (prior to arrival)  Thrombectomy Recommended: No  Decision to Treat Time for Tenecteplase:  (prior to arrival)    NIH Scale:  1a. Level of Consciousness: 0-->Alert, keenly responsive  1b. LOC Questions: 0-->Answers both questions correctly  1c. LOC Commands: 0-->Performs both tasks correctly  2. Best Gaze: 0-->Normal  3. Visual: 0-->No visual loss  4. Facial Palsy: 1-->Minor paralysis (flattened nasolabial fold, asymmetry on smiling)  5a. Motor Arm, Left: 0-->No drift, limb holds 90 (or 45) degrees for full 10 secs  5b. Motor Arm, Right: 0-->No drift, limb holds 90 (or 45) degrees for full 10 secs  6a. Motor Leg, Left: 0-->No drift, leg holds 30 degree position for full 5 secs  6b. Motor Leg, Right: 0-->No drift, leg holds 30 degree position for full 5 secs  7. Limb Ataxia: 0-->Absent  8. Sensory: 0-->Normal, no sensory loss  9. Best Language: 0-->No aphasia, normal  10. Dysarthria: 0-->Normal  11. Extinction and Inattention (formerly Neglect): 0-->No abnormality  Total (NIH Stroke Scale): 1       Modified Letitia Score: 0  [place] Coma Scale:    ABCD2 Score:    [id number] Score:   HAS -BLED Score:   ICH Score:   Hunt & Lowry Classification:      Hemorrhagic change of an Ischemic Stroke: Does this patient have an ischemic stroke with hemorrhagic changes? No     Neurologic Chief Complaint: acute L sided weakness (body and facial) and dizziness    Subjective:     Interval History: weakness has completely resolved and patient is back to baseline    HPI, Past Medical, Family, and Social History remains the same as documented in the initial encounter.      Review of Systems   Constitutional:  Negative for diaphoresis and fever.   HENT:  Negative for nosebleeds and rhinorrhea.    Eyes:  Negative for redness and itching.   Respiratory:  Negative for cough and stridor.    Gastrointestinal:  Negative for abdominal distention and vomiting.   Genitourinary:  Negative for difficulty urinating and hematuria.   Musculoskeletal:  Negative for joint swelling and neck stiffness.   Neurological:  Negative for facial asymmetry, speech difficulty, weakness and numbness.     Scheduled Meds:   aspirin  81 mg Oral Daily    atorvastatin  40 mg Oral Daily    heparin (porcine)  5,000 Units Subcutaneous Q8H    senna-docusate 8.6-50 mg  1 tablet Oral Daily     Continuous Infusions:  PRN Meds:acetaminophen, bisacodyL, hydrALAZINE, labetalol, magnesium oxide, magnesium oxide, potassium bicarbonate, potassium bicarbonate, potassium bicarbonate, potassium, sodium phosphates, potassium, sodium phosphates, potassium, sodium phosphates    Objective:     Vital Signs (Most Recent):  Temp: 97.9 °F (36.6 °C) (02/28/24 0743)  Pulse: 93 (02/28/24 0943)  Resp: 15 (02/28/24 0943)  BP: (!) 171/103 (02/28/24 0943)  SpO2: 96 % (02/28/24 0943)  BP Location: Left arm    Vital Signs Range (Last 24H):  Temp:  [97.9 °F (36.6 °C)-98.6 °F (37 °C)]   Pulse:  []   Resp:  [10-26]   BP: (133-204)/()   SpO2:  [93 %-100 %]   BP Location: Left arm       Physical Exam  Vitals and nursing note reviewed.   Constitutional:       General: He is not in acute distress.  HENT:      Head: Normocephalic and atraumatic.      Nose: Nose normal. No rhinorrhea.   Eyes:      General:         Right eye: No discharge.         Left eye: No discharge.      Conjunctiva/sclera: Conjunctivae normal.   Cardiovascular:      Rate and Rhythm: Normal rate.   Pulmonary:      Effort: Pulmonary effort is normal. No respiratory distress.   Musculoskeletal:         General: No tenderness or deformity.   Skin:     General: Skin is warm and  dry.      Capillary Refill: Capillary refill takes less than 2 seconds.   Neurological:      Mental Status: He is alert.              Neurological Exam:   LOC: alert  Attention Span: Good     Laboratory:  CMP:   Recent Labs   Lab 02/28/24  0110   CALCIUM 9.3   ALBUMIN 3.8   PROT 7.0      K 3.8   CO2 24      BUN 15   CREATININE 0.9   ALKPHOS 68   ALT 37   AST 19   BILITOT 0.4       Diagnostic Results:  Brain/Vessel imaging:  CTA Stroke Multiphase 2/27/24  Normal CT of the head without with contrast. Normal CTA of the head and neck structures. Sinusitis change.     Cardiac Evaluation:   EKG 2/27/24 NSR    Lana Pereira DO  [facility]  Department of Vascular Neurology   [facility]

## 2024-02-28 NOTE — PLAN OF CARE
[facility] - Neuro Critical Care  Initial Discharge Assessment       Primary Care Provider: NONE    Future Appointments   Date Time Provider Department Center   3/4/2024 10:20 AM Frankie Ordaz MD [facility]         Admission Diagnosis: Stroke [I63.9]  Acute focal neurological deficit [R29.818]    Admission Date: 2/27/2024  Expected Discharge Date: 2/29/2024    Transition of Care Barriers: None    Payor: /  [employer]    Extended Emergency Contact Information  Primary Emergency Contact: Yue Silverio   [place]  Mobile Phone: [phone]  Relation: Spouse    Discharge Plan A: Home  Discharge Plan B: Home      [id number] - [place], [place] - [address] AT [facility] [address]  [address]  [facility] [address]  Phone: [phone] Fax: [phone]    [id number] [place], [place] - [address] [facility] [address]  [facility] [address]  [facility] [address]  Phone: [phone] Fax: [phone]    [id number] [place] [address] [facility] & [address] [address]  Phone: [phone] Fax: [phone]      Transferred from:  Home    Past Medical History:   Diagnosis Date    HTN (hypertension)     HTN (hypertension) 2/27/2024    Tobacco use          CM met with patient and Yue Silverio (wife) [phone]  in room for Discharge Planning Assessment.  Patient is able to answer questions.  Per patient, he lives with his wife and 2 children ages 5 and 8 in a 2 story house with 5 step(s) to enter and a rail on the left.   The bed and bath are on the 2nd floor.  Per patient, he was independent with ADLS, working, and driving, and used no dme for ambulation.  Patient will have assistance from his wife upon discharge.   Discharge Planning Booklet given to patient/family and discussed.  All questions addressed.  CM will  follow for needs.      Discharge Plan A and Plan B have been determined by review of patient's clinical status, future medical and therapeutic needs, and coverage/benefits for post-acute care in coordination with multidisciplinary team members.      Initial Assessment (most recent)       Adult Discharge Assessment - 02/28/24 1152          Discharge Assessment    Assessment Type Discharge Planning Assessment     Confirmed/corrected address, phone number and insurance Yes     Confirmed Demographics Correct on Facesheet     Source of Information patient     Communicated LAURENCE with patient/caregiver Yes     People in Home spouse;child(rob), dependent     Facility Arrived From: home     Do you expect to return to your current living situation? Yes     Do you have help at home or someone to help you manage your care at home? Yes     Who are your caregiver(s) and their phone number(s)? Yue Silverio (wife) [phone]     Prior to hospitilization cognitive status: Alert/Oriented     Current cognitive status: Alert/Oriented     Walking or Climbing Stairs Difficulty no     Dressing/Bathing Difficulty no     Home Accessibility stairs to enter home     Number of Stairs, Main Entrance five     Stair Railings, Main Entrance railing on left side (ascending)     Home Layout Bathroom on 2nd floor;Bedroom on 2nd floor     Equipment Currently Used at Home none     Readmission within 30 days? No     Patient currently being followed by outpatient case management? No     Do you currently have service(s) that help you manage your care at home? No     Do you take prescription medications? No     Do you have prescription coverage? Yes     Coverage [employer]     Do you have any problems affording any of your prescribed medications? No     Is the patient taking medications as prescribed? yes     Who is going to help you get home at discharge? Yue Silverio (wife) [phone]     How do you get to doctors appointments?  family or friend will provide     Are you on dialysis? No     Do you take coumadin? No     Discharge Plan A Home     Discharge Plan B Home     DME Needed Upon Discharge  none     Discharge Plan discussed with: Patient     Transition of Care Barriers None        Physical Activity    On average, how many days per week do you engage in moderate to strenuous exercise (like a brisk walk)? 0 days     On average, how many minutes do you engage in exercise at this level? 0 min        Financial Resource Strain    How hard is it for you to pay for the very basics like food, housing, medical care, and heating? Not hard at all        Housing Stability    In the last 12 months, was there a time when you were not able to pay the mortgage or rent on time? No     In the last 12 months, how many places have you lived? 1     In the last 12 months, was there a time when you did not have a steady place to sleep or slept in a shelter (including now)? No        Transportation Needs    In the past 12 months, has lack of transportation kept you from medical appointments or from getting medications? No     In the past 12 months, has lack of transportation kept you from meetings, work, or from getting things needed for daily living? No        Food Insecurity    Within the past 12 months, you worried that your food would run out before you got the money to buy more. Never true     Within the past 12 months, the food you bought just didn't last and you didn't have money to get more. Never true        Stress    Do you feel stress - tense, restless, nervous, or anxious, or unable to sleep at night because your mind is troubled all the time - these days? Not at all        Social Connections    In a typical week, how many times do you talk on the phone with family, friends, or neighbors? Once a week     How often do you get together with friends or relatives? More than three times a week     How often do you attend Oriental orthodox or Mandaeism services?  Never     Do you belong to any clubs or organizations such as Jain groups, unions, fraternal or athletic groups, or school groups? No     How often do you attend meetings of the clubs or organizations you belong to? Never     Are you , , , , never , or living with a partner?         Alcohol Use    Q1: How often do you have a drink containing alcohol? Never     Q2: How many drinks containing alcohol do you have on a typical day when you are drinking? Patient does not drink     Q3: How often do you have six or more drinks on one occasion? Never        OTHER    Name(s) of People in Home Yue Silverio (wife) [phone]                        Discharge Plan A and Plan B have been determined by review of patient's clinical status, future medical and therapeutic needs, and coverage/benefits for post-acute care in coordination with multidisciplinary team members.      Cony Busby RN, CCRN-K, [facility]  Neuro-Critical Care   [phone]

## 2024-02-28 NOTE — PLAN OF CARE
PT evaluation complete. Goals established and met. Pt is baseline with mobility and has no acute PT needs at this time. D/C from PT services.    Problem: Physical Therapy  Goal: Physical Therapy Goal  Description: Goals to be met by: 2024     Patient will increase functional independence with mobility by performin. Gait  x 300 feet with Supervision using no AD. - met      Outcome: Met     2024

## 2024-02-28 NOTE — HOSPITAL COURSE
48 YOM w/HTN and tobacco use presented to [facility] 2/27 for acute L sided weakness (body and facial) and dizziness. CTH negative for bleed. TNK administered. Transferred to [facility] and admitted to [facility] for post TNK monitoring. MRI Brain revealed small acute infarct in the medial right thalamus. DAPT and atorvastatin 40mg initiated for primary and secondary stroke prevention. Amenable to quitting smoking, educated on smoking cessation. Neuro exam stable. Losartan 25mg daily started. Patient will follow up with PCP on March 4, 2024.

## 2024-02-28 NOTE — NURSING
Nurses Note -- 4 Eyes      2/28/2024   5:49 PM      Skin assessed during: Transfer      [x] No Altered Skin Integrity Present    []Prevention Measures Documented      [] Yes- Altered Skin Integrity Present or Discovered   [] LDA Added if Not in Epic (Describe Wound)   [] New Altered Skin Integrity was Present on Admit and Documented in LDA   [] Wound Image Taken    Wound Care Consulted? No    Attending Nurse:  GUY Denis    Second RN/Staff Member:  Zia TOVAR

## 2024-02-28 NOTE — NURSING
Patient brought to MRI via wheelchair on portable monitor with ambu bag present. Brought down with RN x1. Patient tolerated transportation and scan well and is now back in 9083 with VSS.

## 2024-02-28 NOTE — ASSESSMENT & PLAN NOTE
No documented home meds  SBP goal < 180  PRN labetalol, hydralazine  Allow permissive HTN in acute setting, will need outpatient meds  Will need PCP

## 2024-02-28 NOTE — PT/OT/SLP EVAL
"Occupational Therapy   Evaluation and Discharge Note    Name: Kishore Silverio  MRN: [id number]  Admitting Diagnosis: Acute left-sided weakness  Recent Surgery: * No surgery found *      Recommendations:     Discharge Recommendations: No Therapy Indicated  Discharge Equipment Recommendations: none  Barriers to discharge:  None    Assessment:     Kishore Silverio is a 48 y.o. male with a medical diagnosis of Acute left-sided weakness. At this time, patient is functioning at their prior level of function and does not require further acute OT services.     Plan:     During this hospitalization, patient does not require further acute OT services.  Please re-consult if situation changes.    Plan of Care Reviewed with: patient    Subjective     Patient:  "I was driving and all of a sudden I got really dizzy."    Occupational Profile:  Patient resides in [place] with his wife and 2 kids ages 5 and 8 in 2 story home with bedroom on the 2nd floor; 5 steps to enter with rail on left.  Patient is right handed.  PTA patient independent with ADLs including driving.  Works: real estate.  Hobbies: darts, [employer].  Currently owns no DME.      Pain/Comfort:  Pain Rating 1: 0/10  Pain Rating Post-Intervention 1: 0/10    Patients cultural, spiritual, Samaritan conflicts given the current situation: no    Objective:     Communicated with: Nurse prior to session.  Patient found supine with bed alarm, blood pressure cuff, pulse ox (continuous), telemetry, peripheral IV upon OT entry to room.    General Precautions: Standard, aspiration, fall  Orthopedic Precautions: N/A  Braces: N/A  Respiratory Status: Room air     Occupational Performance:    Bed Mobility:    Patient completed Rolling/Turning to Left with  independence  Patient completed Rolling/Turning to Right with independence  Patient completed Supine to Sit with independence  Patient completed Sit to Supine with independence    Functional Mobility/Transfers:  Patient completed Sit <> " Stand Transfer with independence  with  no assistive device   Patient completed Bed <> Chair Transfer using Stand Pivot technique with independence with no assistive device    Activities of Daily Living:  Grooming: independence    Upper Body Dressing: independence    Lower Body Dressing: independence    Toileting: independence      Cognitive/Visual Perceptual:  Cognitive/Psychosocial Skills:     -       Oriented to: Person, Place, Time, and Situation   -       Follows Commands/attention:Follows one-step commands  -       Communication: clear/fluent    Physical Exam:  Postural examination/scapula alignment:    -       Rounded shoulders  Upper Extremity Range of Motion:     -       Right Upper Extremity: WNL  -       Left Upper Extremity: WNL  Upper Extremity Strength:    -       Right Upper Extremity: WNL  -       Left Upper Extremity: WNL    AMPAC 6 Click ADL:  AMPAC Total Score: 24    Treatment & Education:  Patient alert and oriented x 3; able to follow 4/4 one step commands.  Patient attentive and interactive throughout the session.      Patient left supine with all lines intact, call button in reach, and bed alarm on    GOALS:   Multidisciplinary Problems       Occupational Therapy Goals          Problem: Occupational Therapy    Goal Priority Disciplines Outcome Interventions   Occupational Therapy Goal     OT, PT/OT     Description: Goals not set 2* no further OT needed                       History:     Past Medical History:   Diagnosis Date    HTN (hypertension)     HTN (hypertension) 2/27/2024    Tobacco use          Past Surgical History:   Procedure Laterality Date    CYST REMOVAL         Time Tracking:     OT Date of Treatment: 02/28/24  OT Start Time: 0430  OT Stop Time: 0448  OT Total Time (min): 18 min    Billable Minutes:Evaluation 10  Therapeutic Activity: 8    2/28/2024

## 2024-02-28 NOTE — PLAN OF CARE
02/28/24 1132   Post-Acute Status   Post-Acute Authorization Other   Other Status No Post-Acute Service Needs   Discharge Delays None known at this time   Discharge Plan   Discharge Plan A Home     Patient cleared for discharge from case management standpoint.      Chart and orders reviewed.  Patient stepping down to floor with no further case management needs.        Ирина Rushing LMSW  PRN - [facility]  [phone]

## 2024-02-28 NOTE — NURSING
Nursing Transfer Note       Transfer To 931 from 9083    Transfer via wheelchair    Transfer with cardiac monitoring    Transported by RN x1    Medicines sent: No    Chart sent with patient: Yes    Belongings sent with patient: (specify belongings)    Notified: spouse    Bedside Neuro assessment performed: Yes    Bedside Handoff given to: GUY Denis    Upon arrival to floor: cardiac monitor applied, patient oriented to room, call bell in reach, and bed in lowest position    Tele box # [id number]

## 2024-02-28 NOTE — NURSING
Pt c/o discomfort and inability to sleep from EEG cap. TAVIA Rojo aware. Educated pt on the benefits of wearing the cap for detection of seizures. Ok per NP to remove cap. No overt seizure activity noted since cap has been placed. Removed EEG cap @ 2340. Neuro exam unchanged. VSS. WCTM.

## 2024-02-28 NOTE — ASSESSMENT & PLAN NOTE
47 y/o M presents to [facility] with acute LSW. CT negative for acute abnormality, CTA negative for LVO. TNK administered at 1043 on 2/27, with improvement in NIH from 7 to 3 per stroke team documentation. CT repeated in ED due to waxing/waning wakefulness with questionable hypodensity in R thalamus. NIH 5 in NCC. Stroke team aware of change.    -Admit to NCC, stroke team following  -q1h neuro checks, vital checks  -EKG, ECHO, CXR  -A1c, TSH, lipid panel  -Daily CBC, CMP, mag, phos  -SBP < 180, prn labetalol and hydralazine  -Statin  -SCDs, Hold ACAP in acute post TNK setting  -PT/OT/SLP  -MRI pending, EEG cap ordered to r/o seizures in setting of waxing/waning exam      Antithrombotics for secondary stroke prevention: Antiplatelets: Aspirin: 81 mg daily  Clopidogrel: 75 mg daily    Statins for secondary stroke prevention and hyperlipidemia, if present:   Statins: Atorvastatin- 40 mg daily    Aggressive risk factor modification: HTN, Smoking, DM, HLD, Diet, Exercise, Obesity     Rehab efforts: The patient has been evaluated by a stroke team provider and the therapy needs have been fully considered based off the presenting complaints and exam findings. The following therapy evaluations are needed: PT evaluate and treat, OT evaluate and treat, SLP evaluate and treat, PM&R evaluate for appropriate placement    Diagnostics ordered/pending: CTA Head to assess vasculature , HgbA1C to assess blood glucose levels, Lipid Profile to assess cholesterol levels, TTE to assess cardiac function/status , TSH to assess thyroid function    VTE prophylaxis: Heparin 5000 units SQ every 8 hours    BP parameters: Infarct: Post Thrombolytic therapy, SBP <180

## 2024-02-28 NOTE — CARE UPDATE
I have reviewed the chart of Kishore Silverio who is hospitalized for the following:    Active Hospital Problems    Diagnosis    *Cerebrovascular accident (CVA) due to thrombosis of right posterior cerebral artery    Hemiparesis as late effect of cerebrovascular disease     Due to stroke  Therapy to evaluate and treat       Hypertensive emergency       Blood pressure (!) 184/114,    Found to have stroke  Require IV medication to decrease bp       Acute left-sided weakness    Tobacco use    HTN (hypertension)    S/P admn tPA in diff fac w/n last 24 hr bef adm to crnt fac          Yennifer Rey NP  Unit Based [id number]

## 2024-02-28 NOTE — SUBJECTIVE & OBJECTIVE
Neurologic Chief Complaint: acute L sided weakness (body and facial) and dizziness    Subjective:     Interval History: weakness has completely resolved and patient is back to baseline    HPI, Past Medical, Family, and Social History remains the same as documented in the initial encounter.     Review of Systems   Constitutional:  Negative for diaphoresis and fever.   HENT:  Negative for nosebleeds and rhinorrhea.    Eyes:  Negative for redness and itching.   Respiratory:  Negative for cough and stridor.    Gastrointestinal:  Negative for abdominal distention and vomiting.   Genitourinary:  Negative for difficulty urinating and hematuria.   Musculoskeletal:  Negative for joint swelling and neck stiffness.   Neurological:  Negative for facial asymmetry, speech difficulty, weakness and numbness.     Scheduled Meds:   aspirin  81 mg Oral Daily    atorvastatin  40 mg Oral Daily    heparin (porcine)  5,000 Units Subcutaneous Q8H    senna-docusate 8.6-50 mg  1 tablet Oral Daily     Continuous Infusions:  PRN Meds:acetaminophen, bisacodyL, hydrALAZINE, labetalol, magnesium oxide, magnesium oxide, potassium bicarbonate, potassium bicarbonate, potassium bicarbonate, potassium, sodium phosphates, potassium, sodium phosphates, potassium, sodium phosphates    Objective:     Vital Signs (Most Recent):  Temp: 97.9 °F (36.6 °C) (02/28/24 0743)  Pulse: 93 (02/28/24 0943)  Resp: 15 (02/28/24 0943)  BP: (!) 171/103 (02/28/24 0943)  SpO2: 96 % (02/28/24 0943)  BP Location: Left arm    Vital Signs Range (Last 24H):  Temp:  [97.9 °F (36.6 °C)-98.6 °F (37 °C)]   Pulse:  []   Resp:  [10-26]   BP: (133-204)/()   SpO2:  [93 %-100 %]   BP Location: Left arm       Physical Exam  Vitals and nursing note reviewed.   Constitutional:       General: He is not in acute distress.  HENT:      Head: Normocephalic and atraumatic.      Nose: Nose normal. No rhinorrhea.   Eyes:      General:         Right eye: No discharge.         Left eye:  No discharge.      Conjunctiva/sclera: Conjunctivae normal.   Cardiovascular:      Rate and Rhythm: Normal rate.   Pulmonary:      Effort: Pulmonary effort is normal. No respiratory distress.   Musculoskeletal:         General: No tenderness or deformity.   Skin:     General: Skin is warm and dry.      Capillary Refill: Capillary refill takes less than 2 seconds.   Neurological:      Mental Status: He is alert.              Neurological Exam:   LOC: alert  Attention Span: Good     Laboratory:  CMP:   Recent Labs   Lab 02/28/24  0110   CALCIUM 9.3   ALBUMIN 3.8   PROT 7.0      K 3.8   CO2 24      BUN 15   CREATININE 0.9   ALKPHOS 68   ALT 37   AST 19   BILITOT 0.4       Diagnostic Results:  Brain/Vessel imaging:  CTA Stroke Multiphase 2/27/24  Normal CT of the head without with contrast. Normal CTA of the head and neck structures. Sinusitis change.     Cardiac Evaluation:   EKG 2/27/24 NSR

## 2024-02-28 NOTE — PLAN OF CARE
[facility] Care Plan    POC reviewed with Kishore Silverio and family at 0300. Pt verbalized understanding. Questions and concerns addressed. No acute events overnight. Pt progressing toward goals. Will continue to monitor. See below and flowsheets for full assessment and VS info.   -NAEON  -sbps maintained <180 w/o interventions   -EEG removed, see previous note for details   -tylenol given x1 for HA     Is this a stroke patient? yes- Stroke booklet reviewed with patient and family, risk factors identified for patient and stroke booklet remains at bedside for ongoing education.     Neuro:  [place] Coma Scale  Best Eye Response: 4-->(E4) spontaneous  Best Motor Response: 6-->(M6) obeys commands  Best Verbal Response: 5-->(V5) oriented  [place] Coma Scale Score: 15  Assessment Qualifiers: patient not sedated/intubated, no eye obstruction present  Pupil PERRLA: yes     24hr Temp:  [98 °F (36.7 °C)-98.6 °F (37 °C)]     CV:   Rhythm: normal sinus rhythm  BP goals:   SBP < 180  MAP > 65    Resp:           Plan: N/A    GI/:     Diet/Nutrition Received: regular  Last Bowel Movement: 02/27/24       Intake/Output Summary (Last 24 hours) at 2/28/2024 0557  Last data filed at 2/28/2024 0301  Gross per 24 hour   Intake --   Output 1150 ml   Net -1150 ml          Labs/Accuchecks:  Recent Labs   Lab 02/28/24  0110   WBC 8.98   RBC 5.72   HGB 15.5   HCT 46.9         Recent Labs   Lab 02/28/24  0110      K 3.8   CO2 24      BUN 15   CREATININE 0.9   ALKPHOS 68   ALT 37   AST 19   BILITOT 0.4      Recent Labs   Lab 02/27/24  0930   INR 1.0      Recent Labs   Lab 02/27/24  1517   TROPONINI <0.006       Electrolytes: N/A - electrolytes WDL  Accuchecks: Q6H    Gtts:      LDA/Wounds:    Nurses Note -- 4 Eyes      2/28/2024   3:23 AM      Skin assessed during: Q Shift Change    Is there altered skin present? no   [x] No Altered Skin Integrity Present    [x]Prevention Measures  Documented    Attending Nurse:  GUY Louise     Second RN/Staff Member:      BRISEYDA    Problem: Adult Inpatient Plan of Care  Goal: Plan of Care Review  Flowsheets (Taken 2/28/2024 0322)  Plan of Care Reviewed With: patient     Problem: Adjustment to Illness (Stroke, Ischemic/Transient Ischemic Attack)  Goal: Optimal Coping  Intervention: Support Psychosocial Response to Stroke  Flowsheets (Taken 2/28/2024 0322)  Supportive Measures:   active listening utilized   positive reinforcement provided   relaxation techniques promoted   verbalization of feelings encouraged  Family/Support System Care: support provided     Problem: Cognitive Impairment (Stroke, Ischemic/Transient Ischemic Attack)  Goal: Optimal Cognitive Function  Intervention: Optimize Cognitive Function  Flowsheets (Taken 2/28/2024 0322)  Environment Familiarity/Consistency: daily routine followed  Reorientation Measures:   calendar in view   clock in view  Sensory Stimulation Regulation: care clustered     Problem: Functional Ability Impaired (Stroke, Ischemic/Transient Ischemic Attack)  Goal: Optimal Functional Ability  Intervention: Optimize Functional Ability  Flowsheets (Taken 2/28/2024 0322)  Activity Management: Rolling - L1

## 2024-02-28 NOTE — CONSULTS
Inpatient consult to Physical Medicine Rehab  Consult performed by: Korina Luke NP  Consult ordered by: Nasreen Chaves, DIMA  Reason for consult: Rehab      Consult received. PM&R following.     LIOR Contreras, FNP-C  Physical Medicine & Rehabilitation   02/28/2024

## 2024-02-28 NOTE — MEDICAL/APP STUDENT
HPI-  Pt is a 48 year old man that was transferred from [facility] due to acute onset left sided weakness and dizziness. His PMH includes 15+ year hx of smoking and HTN. His symptoms began the night of the 2/26 before when he indicated that he had a headache. His LKW was around 830 the morning of 2/27. His headache worsened and he began to endorse some dizziness and weakness in the am when driving for work and he had to be picked up by his wife. He presented to [facility] with left sided weakness, spaital neglect, facial droop and visual defecit. Pt also had some slurring of his speech and increased drowsiness when which is unlike anything he has had in the past. MRI: small acute infarct on the right medial thalamus.CTA showed normal head and neck structures, while CT showed unremarkable findings, with possible density in the right medial thalamus.  Pt was started on TNK ~15-20 min after arrival at the hospital and has slightly improved on his weakness since yesterday.   Subjective  Pt offers no complaints and appears to have returned to baseline neurological condition. He was stepped down yesterday and PT/OT indicates he doesn't need more acute PT and is good for discharged. Says he doesn't feell any weakness or dizziness that he presented with and had no question regarding his current condition. Pt indicated that he has been able to walking around in his room with no issues with Bm or urination. Updated pt on current plan of smoking cessation, statin, and HTN control    Objective-  Vitals-  Temp-98.1, Pulse -95, Resp- 16, BP-169/110(204/120), SPO2=96%    Physical exam-  Neurologic Exam     Mental Status   Oriented to person, place, and time.   Attention: normal. Concentration: normal.   Speech: speech is normal   Level of consciousness: alert  Knowledge: good.   Able to name object. Able to read. Able to repeat. Normal comprehension.     Cranial Nerves     CN II   Visual fields full to confrontation.   Visual acuity:  normal  Right visual field deficit: none  Left visual field deficit: none     CN III, IV, VI   Pupils: pinpoint  Right pupil: Size: 2 mm. Shape: regular. Reactivity: brisk.   Left pupil: Size: 2 mm. Shape: regular. Reactivity: brisk.   CN III: no CN III palsy  CN VI: no CN VI palsy  Nystagmus: none   Diplopia: none  Ophthalmoparesis: none    CN V   Facial sensation intact.   Right facial sensation deficit: none  Left facial sensation deficit: none    CN VII   Facial expression full, symmetric.     CN VIII   CN VIII normal.     CN IX, X   CN IX normal.     CN XI   CN XI normal.     CN XII   CN XII normal.   Slight facial ptosis on the left side     Motor Exam   Muscle bulk: normal  Overall muscle tone: normal  Right arm tone: normal  Left arm tone: normal    Strength   Strength 5/5 throughout.     Sensory Exam   Light touch normal.     Gait, Coordination, and Reflexes     Coordination   Finger to nose coordination: normal  Heel to shin coordination: normal    NIHSS-1(mild facial palsy on left side)    Labs-  unremarkable, Glucose 167, [id number]-9.3, Cholesterol 211, Triglycerides 221    Imaging-    ECHO- EF 55-60%, no diastolic dysfunction, increased left ventricle wall thickening  Assessment/Plan-  Left sided weakness  48 yr old male that presented to [facility] with left sided weakness, L spatial neglect, facial droop, visual defecit that likely has had focal right medial thalamic stroke/TIA in the with current etiology believed to be due to chronic HTN vs atherosclerotic plaque. Current plan would be to control BP as recommneded(SBP <180. DBP <90 after thrombolytic therapy), consider starting pt on ACE/ARB . In addition, continue pt on statin, low dose ASA, and heparin in hospital with transition to DAPT therapy out of hospital. HbA1C 6.3 so consider insulin sliding scale in hospital, with transition to metformin out patient.  Tobacco  - pt on cessation, need smoking cessation consult on discharge  -Offered using  Summer(gum)

## 2024-02-28 NOTE — PT/OT/SLP EVAL
"Physical Therapy Evaluation and Discharge Note    Patient Name:  Kishore Silverio   MRN:  [id number]    Recommendations:     Discharge Recommendations: No Therapy Indicated   Discharge Equipment Recommendations: none   Barriers to discharge: None    Assessment:     Kishore Silverio is a 48 y.o. male admitted with a medical diagnosis of Cerebrovascular accident (CVA) due to thrombosis of right posterior cerebral artery. Patient is able to complete all visualized functional mobility with supervision. They are functioning at their baseline and no longer require acute care physical therapy.    Plan:     During this hospitalization, patient does not require further acute PT services. Please re-consult if situation changes.      Subjective     Chief Complaint: None verbalized  Patient/Family Comments/goals: "I feel woozy, sorta like I am high."  Pain/Comfort:  Pain Rating 1: 0/10    Patients cultural, spiritual, Jew conflicts given the current situation: no    Living Environment:  Living Environment: Patient lives with their wife and kids  in a 2 story house with number of outside stair(s): 5 with B HR, number of inside stair(s): 2 flights with R HR, and walk-in shower.  Prior Level of Function: Prior to admission, patient was independent and driving and working.  Equipment Used at Home: none.  DME owned (not currently used): none  Assistance Upon Discharge: family    Objective:     Communicated with RN prior to session.  Patient found HOB elevated with telemetry upon PT entry to room.    General Precautions: Standard, fall   Orthopedic Precautions:N/A   Braces: N/A    Exams:  Cognitive Exam:  Patient is oriented to Person, Place, Time, Situation  RLE ROM: WFL  RLE Strength: WFL  LLE ROM: WFL  LLE Strength: WFL  Sensation: Intact light touch to BLEs    Functional Mobility:  Bed Mobility:    Supine to Sit: supervision  Sit to Supine: supervision  Transfers:     Sit to Stand: supervision with no AD with cues for hand " placement  Gait: Patient ambulated 400' with no AD and supervision.   Patient demonstrates steady gait, decreased step length, wide base of support, decreased foot clearance, flexed posture, and decreased quinton.  Patient required cues for upright posture, gluteal activation, sequencing, increased step size, and increased foot clearance.  All lines remained intact throughout ambulation trial, gait belt utilized.  Balance:   Static Sitting: Good, able to maintain for 2 minute(s) with supervision  Dynamic Sitting: Good: Patient accepts moderate challenge, supervision  Static Standing: Good, able to maintain for 2 minute(s) with supervision  Dynamic Standing: Good: Patient accepts moderate challenge, supervision  Stairs:  Pt ascended/descended 1 flight(s) with No Assistive Device with right handrail with Stand-by Assistance.     Therapeutic Activities and Exercises:  Patient educated on role of acute care PT and PT POC, safety while in hospital including calling nurse for mobility, and call light usage  Pt educated on the effects of bed rest and the importance of OOB activity. Pt encouraged to sit UIC majority of day as tolerated and continue daily ambulation with nursing/family assist. Pt verbalized understanding.  Educated about gradual progression of activity once out of hospital  Educated about safety with functional mobility  Answered all questions within PT scope of practice to patient's satisfaction    AM-PAC 6 CLICK MOBILITY  Total Score:19     Patient left HOB elevated with all lines intact, call button in reach, RN notified, and family present.    GOALS:   Multidisciplinary Problems       Physical Therapy Goals       Not on file              Multidisciplinary Problems (Resolved)          Problem: Physical Therapy    Goal Priority Disciplines Outcome Goal Variances Interventions   Physical Therapy Goal   (Resolved)     PT, PT/OT Met     Description: Goals to be met by: 2/28/2024     Patient will increase  functional independence with mobility by performin. Gait  x 300 feet with Supervision using no AD. - met                           History:     Past Medical History:   Diagnosis Date    HTN (hypertension)     HTN (hypertension) 2024    Tobacco use        Past Surgical History:   Procedure Laterality Date    CYST REMOVAL         Time Tracking:     PT Received On: 24  PT Start Time: 1402     PT Stop Time: 1411  PT Total Time (min): 9 min     Billable Minutes: Evaluation 9      2024

## 2024-02-28 NOTE — PROGRESS NOTES
[facility] - Neurosurgery ([facility])  Neurocritical Care  Progress Note    Admit Date: 2/27/2024  Service Date: 02/28/2024  Length of Stay: 1    Subjective:     Chief Complaint: Acute left-sided weakness    History of Present Illness: Mr. Chapincito Silverio is a 48 year old man with a PMHx of smoking and HTN who presents to [facility] s/p TNK administration. He initially presented to [facility] 2/27 for acute left sided weakness. Telestroke NIHSS 7. CTH no acute abnormality and CTA negative for LVO. Received TNK prior to transfer, end time 1030. Arrival NIHSS 3 with improvement in LSW. CT head was repeated upon [facility] out of concern for diminished wakefulness, which displayed questionable R thalamic hypodensity. He will be admitted to [facility] for hourly neuro-monitoring and a higher level of care.     Hospital Course: 02/28/2024 MRI brain complete, R thalamic stroke; begin asa, plavix, sqh; transfer to     Interval History: See hospital course.     Review of Systems: Review of Systems   Constitutional:  Negative for chills and fever.   Eyes:  Negative for blurred vision and double vision.   Respiratory:  Negative for cough and shortness of breath.    Cardiovascular:  Negative for chest pain and palpitations.   Gastrointestinal:  Negative for nausea and vomiting.   Genitourinary:  Negative for frequency and urgency.   Musculoskeletal:  Negative for back pain and neck pain.   Neurological:  Positive for focal weakness. Negative for sensory change and speech change.         Vitals:   Temp: 98 °F (36.7 °C)  Pulse: 100 (Simultaneous filing. User may not have seen previous data.)  Rhythm: normal sinus rhythm  BP: (!) 163/112  MAP (mmHg): 130  Resp: 16  SpO2: 96 %    Temp  Min: 97.9 °F (36.6 °C)  Max: 98.6 °F (37 °C)  Pulse  Min: 67  Max: 110  BP  Min: 133/72  Max: 204/120  MAP (mmHg)  Min: 97  Max: 156  Resp  Min: 10  Max: 26  SpO2  Min: 93 %  Max: 100 %    02/27 0701 - 02/28 0700  In: -   Out: 1150 [Urine:1150]      "    Examination:   Constitutional: Well-nourished and -developed. No apparent distress.   Eyes: Conjunctiva clear, anicteric. Lids no lesions.  Head/Ears/Nose/Mouth/Throat/Neck: Moist mucous membranes. External ears, nose atraumatic.   Cardiovascular: Regular rhythm. No murmurs. No leg edema.  Respiratory: Comfortable respirations. Clear to auscultation.  Gastrointestinal: No hernia. Soft, nondistended, nontender. + bowel sounds.    Neurologic:  -GCS [id number]  -Alert. Oriented to person, place, and time. Speech fluent. Follows commands.  -Cranial nerves II-XII intact, particularly   -Motor Slightly decreased strength in LUE, all others 5/5  -Sensation intact      Medications:   Continuous Scheduledaspirin, 81 mg, Daily  atorvastatin, 40 mg, Daily  clopidogreL, 75 mg, Daily  heparin (porcine), 5,000 Units, Q8H  senna-docusate 8.6-50 mg, 1 tablet, Daily    PRNacetaminophen, 650 mg, Q6H PRN  bisacodyL, 10 mg, Daily PRN  hydrALAZINE, 10 mg, Q4H PRN  labetalol, 10 mg, Q4H PRN       Today I independently reviewed pertinent medications, lines/drains/airways, imaging, cardiology results, laboratory results, microbiology results, notably:     ISTAT: No results for input(s): "PH", "PCO2", "PO2", "POCSATURATED", "HCO3", "BE", "POCNA", "POCK", "POCTCO2", "POCGLU", "POCICA", "POCLAC", "SAMPLE" in the last 24 hours.   Chem:   Recent Labs   Lab 02/27/24  1517 02/28/24  0110   NA  --  140   K  --  3.8   CL  --  105   CO2  --  24   GLU  --  167*   BUN  --  15   CREATININE  --  0.9   CALCIUM  --  9.3   MG  --  2.0   PHOS  --  3.0   ANIONGAP  --  11   PROT  --  7.0   ALBUMIN  --  3.8   BILITOT  --  0.4   ALKPHOS  --  68   AST  --  19   ALT  --  37   TROPONINI <0.006  --      Heme:   Recent Labs   Lab 02/28/24  0110   WBC 8.98   HGB 15.5   HCT 46.9        Endo:   Recent Labs   Lab 02/27/24  1241 02/27/24  1823 02/28/24  1152   POCTGLUCOSE 104 150* 159*          Assessment/Plan:     Neuro  * Acute left-sided weakness  47 y/o M " presents to [facility] with acute LSW. CT negative for acute abnormality, CTA negative for LVO. TNK administered at 1043 on 2/27, with improvement in NIH from 7 to 3 per stroke team documentation. CT repeated in ED due to waxing/waning wakefulness with questionable hypodensity in R thalamus. NIH 5 in NCC. Stroke team aware of change.    -Admit to [facility], stroke team following  -q1h neuro checks, vital checks  -EKG, ECHO, CXR  -A1c, TSH, lipid panel  -Daily CBC, CMP, mag, phos  -SBP < 180, prn labetalol and hydralazine  -Statin  -SCDs, Hold ACAP in acute post TNK setting  -PT/OT/SLP  -MRI pending, EEG cap ordered to r/o seizures in setting of waxing/waning exam    Right thalamic infarction  47 y/o M presents to [facility] with acute LSW. CT negative for acute abnormality, CTA negative for LVO. TNK administered at 1043 on 2/27, with improvement in NIH from 7 to 3 per stroke team documentation. CT repeated in ED due to waxing/waning wakefulness with questionable hypodensity in R thalamus. NIH 5 in NCC. Stroke team aware of change.    -Admit to [facility], stroke team following  -q1h neuro checks, vital checks  -EKG, ECHO, CXR  -A1c, TSH, lipid panel  -Daily CBC, CMP, mag, phos  -SBP < 180, prn labetalol and hydralazine  -Statin  -SCDs, Hold ACAP in acute post TNK setting  -PT/OT/SLP  -MRI pending, EEG cap ordered to r/o seizures in setting of waxing/waning exam      Antithrombotics for secondary stroke prevention: Antiplatelets: Aspirin: 81 mg daily  Clopidogrel: 75 mg daily    Statins for secondary stroke prevention and hyperlipidemia, if present:   Statins: Atorvastatin- 40 mg daily    Aggressive risk factor modification: HTN, Smoking, DM, HLD, Diet, Exercise, Obesity     Rehab efforts: The patient has been evaluated by a stroke team provider and the therapy needs have been fully considered based off the presenting complaints and exam findings. The following therapy evaluations are needed: PT evaluate and treat, OT evaluate and treat, SLP  evaluate and treat, PM&R evaluate for appropriate placement    Diagnostics ordered/pending: CTA Head to assess vasculature , HgbA1C to assess blood glucose levels, Lipid Profile to assess cholesterol levels, TTE to assess cardiac function/status , TSH to assess thyroid function    VTE prophylaxis: Heparin 5000 units SQ every 8 hours    BP parameters: Infarct: Post Thrombolytic therapy, SBP <180        Cardiac/Vascular  HTN (hypertension)  No documented home meds  SBP goal < 180  PRN labetalol, hydralazine  Allow permissive HTN in acute setting, will need outpatient meds  Will need PCP    Hematology  S/P admn tPA in diff fac w/n last 24 hr bef adm to crnt fac  See primary problem     Other  Tobacco use  Stroke risk factor  Cessation counseling as appropriate          The patient is being Prophylaxed for:  Venous Thromboembolism with: Chemical  Stress Ulcer with: Not Applicable   Ventilator Pneumonia with: not applicable    Activity Orders            Diet Adult Regular (IDDSI Level 7) Thin: Regular starting at 02/27 1527    Turn patient starting at 02/27 1400    Elevate HOB starting at 02/27 1333          Full Code    La [place] PA-C  Neurocritical Care  [facility] - Neurosurgery ([facility])

## 2024-02-29 VITALS
DIASTOLIC BLOOD PRESSURE: 108 MMHG | SYSTOLIC BLOOD PRESSURE: 177 MMHG | BODY MASS INDEX: 31.5 KG/M2 | OXYGEN SATURATION: 96 % | WEIGHT: 220 LBS | RESPIRATION RATE: 18 BRPM | HEART RATE: 86 BPM | HEIGHT: 70 IN | TEMPERATURE: 99 F

## 2024-02-29 LAB
ALBUMIN SERPL BCP-MCNC: 3.7 G/DL (ref 3.5–5.2)
ALP SERPL-CCNC: 68 U/L (ref 55–135)
ALT SERPL W/O P-5'-P-CCNC: 40 U/L (ref 10–44)
ANION GAP SERPL CALC-SCNC: 9 MMOL/L (ref 8–16)
AST SERPL-CCNC: 24 U/L (ref 10–40)
BASOPHILS # BLD AUTO: 0.03 K/UL (ref 0–0.2)
BASOPHILS NFR BLD: 0.5 % (ref 0–1.9)
BILIRUB SERPL-MCNC: 0.4 MG/DL (ref 0.1–1)
BUN SERPL-MCNC: 21 MG/DL (ref 6–20)
CALCIUM SERPL-MCNC: 8.9 MG/DL (ref 8.7–10.5)
CHLORIDE SERPL-SCNC: 107 MMOL/L (ref 95–110)
CO2 SERPL-SCNC: 20 MMOL/L (ref 23–29)
CREAT SERPL-MCNC: 1 MG/DL (ref 0.5–1.4)
DIFFERENTIAL METHOD BLD: ABNORMAL
EOSINOPHIL # BLD AUTO: 0.1 K/UL (ref 0–0.5)
EOSINOPHIL NFR BLD: 1.9 % (ref 0–8)
ERYTHROCYTE [DISTWIDTH] IN BLOOD BY AUTOMATED COUNT: 14 % (ref 11.5–14.5)
EST. GFR  (NO RACE VARIABLE): >60 ML/MIN/1.73 M^2
GLUCOSE SERPL-MCNC: 126 MG/DL (ref 70–110)
HCT VFR BLD AUTO: 45.7 % (ref 40–54)
HGB BLD-MCNC: 15.5 G/DL (ref 14–18)
IMM GRANULOCYTES # BLD AUTO: 0.03 K/UL (ref 0–0.04)
IMM GRANULOCYTES NFR BLD AUTO: 0.5 % (ref 0–0.5)
LYMPHOCYTES # BLD AUTO: 1.7 K/UL (ref 1–4.8)
LYMPHOCYTES NFR BLD: 26.6 % (ref 18–48)
MAGNESIUM SERPL-MCNC: 2 MG/DL (ref 1.6–2.6)
MCH RBC QN AUTO: 27.9 PG (ref 27–31)
MCHC RBC AUTO-ENTMCNC: 33.9 G/DL (ref 32–36)
MCV RBC AUTO: 82 FL (ref 82–98)
MONOCYTES # BLD AUTO: 0.7 K/UL (ref 0.3–1)
MONOCYTES NFR BLD: 11.4 % (ref 4–15)
NEUTROPHILS # BLD AUTO: 3.7 K/UL (ref 1.8–7.7)
NEUTROPHILS NFR BLD: 59.1 % (ref 38–73)
NRBC BLD-RTO: 0 /100 WBC
PHOSPHATE SERPL-MCNC: 3.6 MG/DL (ref 2.7–4.5)
PLATELET # BLD AUTO: 194 K/UL (ref 150–450)
PLATELET BLD QL SMEAR: ABNORMAL
PMV BLD AUTO: 11.4 FL (ref 9.2–12.9)
POCT GLUCOSE: 106 MG/DL (ref 70–110)
POCT GLUCOSE: 111 MG/DL (ref 70–110)
POCT GLUCOSE: 112 MG/DL (ref 70–110)
POCT GLUCOSE: 122 MG/DL (ref 70–110)
POTASSIUM SERPL-SCNC: 4.2 MMOL/L (ref 3.5–5.1)
PROT SERPL-MCNC: 7.1 G/DL (ref 6–8.4)
RBC # BLD AUTO: 5.56 M/UL (ref 4.6–6.2)
SODIUM SERPL-SCNC: 136 MMOL/L (ref 136–145)
WBC # BLD AUTO: 6.31 K/UL (ref 3.9–12.7)

## 2024-02-29 PROCEDURE — 25000003 PHARM REV CODE 250: Performed by: PHYSICIAN ASSISTANT

## 2024-02-29 PROCEDURE — 63600175 PHARM REV CODE 636 W HCPCS: Performed by: PHYSICIAN ASSISTANT

## 2024-02-29 PROCEDURE — 83735 ASSAY OF MAGNESIUM: CPT | Performed by: PHYSICIAN ASSISTANT

## 2024-02-29 PROCEDURE — 84100 ASSAY OF PHOSPHORUS: CPT | Performed by: PHYSICIAN ASSISTANT

## 2024-02-29 PROCEDURE — 99233 SBSQ HOSP IP/OBS HIGH 50: CPT | Mod: FS,,, | Performed by: PSYCHIATRY & NEUROLOGY

## 2024-02-29 PROCEDURE — 99291 CRITICAL CARE FIRST HOUR: CPT | Mod: ,,, | Performed by: STUDENT IN AN ORGANIZED HEALTH CARE EDUCATION/TRAINING PROGRAM

## 2024-02-29 PROCEDURE — 85025 COMPLETE CBC W/AUTO DIFF WBC: CPT | Performed by: PHYSICIAN ASSISTANT

## 2024-02-29 PROCEDURE — 36415 COLL VENOUS BLD VENIPUNCTURE: CPT | Performed by: PHYSICIAN ASSISTANT

## 2024-02-29 PROCEDURE — 80053 COMPREHEN METABOLIC PANEL: CPT | Performed by: PHYSICIAN ASSISTANT

## 2024-02-29 PROCEDURE — 25000003 PHARM REV CODE 250

## 2024-02-29 RX ORDER — ATORVASTATIN CALCIUM 40 MG/1
40 TABLET, FILM COATED ORAL DAILY
Qty: 30 TABLET | Refills: 2 | Status: SHIPPED | OUTPATIENT
Start: 2024-03-01 | End: 2024-03-04 | Stop reason: SDUPTHER

## 2024-02-29 RX ORDER — LOSARTAN POTASSIUM 25 MG/1
25 TABLET ORAL DAILY
Status: DISCONTINUED | OUTPATIENT
Start: 2024-02-29 | End: 2024-02-29 | Stop reason: HOSPADM

## 2024-02-29 RX ORDER — LOSARTAN POTASSIUM 25 MG/1
25 TABLET ORAL DAILY
Qty: 30 TABLET | Refills: 0 | Status: SHIPPED | OUTPATIENT
Start: 2024-03-01 | End: 2024-03-04 | Stop reason: SDUPTHER

## 2024-02-29 RX ORDER — ASPIRIN 81 MG/1
81 TABLET ORAL DAILY
Qty: 90 TABLET | Refills: 3 | Status: SHIPPED | OUTPATIENT
Start: 2024-03-01 | End: 2024-06-07 | Stop reason: SDUPTHER

## 2024-02-29 RX ORDER — CLOPIDOGREL BISULFATE 75 MG/1
75 TABLET ORAL DAILY
Qty: 21 TABLET | Refills: 0 | Status: SHIPPED | OUTPATIENT
Start: 2024-03-01 | End: 2024-04-09

## 2024-02-29 RX ADMIN — ASPIRIN 81 MG: 81 TABLET, COATED ORAL at 09:02

## 2024-02-29 RX ADMIN — HYDRALAZINE HYDROCHLORIDE 10 MG: 20 INJECTION, SOLUTION INTRAMUSCULAR; INTRAVENOUS at 04:02

## 2024-02-29 RX ADMIN — ATORVASTATIN CALCIUM 40 MG: 40 TABLET, FILM COATED ORAL at 09:02

## 2024-02-29 RX ADMIN — LOSARTAN POTASSIUM 25 MG: 25 TABLET, FILM COATED ORAL at 12:02

## 2024-02-29 RX ADMIN — ACETAMINOPHEN 650 MG: 325 TABLET ORAL at 07:02

## 2024-02-29 RX ADMIN — HEPARIN SODIUM 5000 UNITS: 5000 INJECTION INTRAVENOUS; SUBCUTANEOUS at 04:02

## 2024-02-29 RX ADMIN — CLOPIDOGREL BISULFATE 75 MG: 75 TABLET ORAL at 09:02

## 2024-02-29 RX ADMIN — DOCUSATE SODIUM AND SENNOSIDES 1 TABLET: 8.6; 5 TABLET, FILM COATED ORAL at 09:02

## 2024-02-29 NOTE — PROGRESS NOTES
[facility] - Neurosurgery ([facility])  Vascular Neurology  Comprehensive Stroke Center  Progress Note    Assessment/Plan:     * Cerebrovascular accident (CVA) due to thrombosis of right posterior cerebral artery  Kishore Silverio is a 48 y.o. male 8 year old man with smoking, HTN (not on meds) presented to [facility] 2/27 for acute left sided weakness. Telestroke NIHSS 7. CTH no acute abnormality and CTA negative for LVO. Received TNK prior to transfer. Arrival NIHSS 3 with improvement in LSW. To be admitted to [facility] for post-TNK monitoring. MRI showed small acute infarct in the medial right thalamus.     Patient stepped down to NPU over night. Neuro exam stable. Losartan 25mg daily started. Patient will follow up with PCP on March 4, 2024.      Antithrombotics for secondary stroke prevention: Antiplatelets: Aspirin: 81 mg daily  Clopidogrel: 75 mg daily    Statins for secondary stroke prevention and hyperlipidemia, if present:   Statins: Atorvastatin- 40 mg daily    Aggressive risk factor modification: HTN, Smoking, Diet, Exercise     Rehab efforts: The patient has been evaluated by a stroke team provider and the therapy needs have been fully considered based off the presenting complaints and exam findings. The following therapy evaluations are needed: PT evaluate and treat, OT evaluate and treat, SLP evaluate and treat, PM&R evaluate for appropriate placement    Diagnostics ordered/pending: None     VTE prophylaxis: Heparin 5000 units SQ every 8 hours  Mechanical prophylaxis: Place SCDs    BP parameters: Infarct: Post Thrombolytic therapy, SBP <180        HTN (hypertension)  Stroke risk factor. Patient reports history of, however not on meds prior to admission.  - goal SBP <180 after thrombolytic therapy    Tobacco use  Stroke risk factor. Active smoker.  - counseled on cessation  - will need smoking cessation referral on discharge    Acute left-sided weakness  RESOLVED.         48 YOM w/HTN and tobacco use presented to  [facility] 2/27 for acute L sided weakness (body and facial) and dizziness. CTH negative for bleed. TNK administered. Transferred to [facility] and admitted to [facility] for post TNK monitoring. MRI Brain revealed small acute infarct in the medial right thalamus. DAPT and atorvastatin 40mg initiated for primary and secondary stroke prevention. Amenable to quitting smoking, educated on smoking cessation. Neuro exam stable. Losartan 25mg daily started. Patient will follow up with PCP on March 4, 2024.    STROKE DOCUMENTATION   Acute Stroke Times   Last Known Normal Date: 02/27/24  Last Known Normal Time: 0830  Symptom Onset Date: 02/27/24  Symptom Onset Time: 0830  Stroke Team Called Date: 02/27/24  Stroke Team Called Time: 1211  Stroke Team Arrival Date: 02/27/24  Stroke Team Arrival Time: 1216  CT Interpretation Time:  (prior to arrival)  Thrombolytic Therapy Recommended: Yes  CTA Interpretation Time:  (prior to arrival)  Thrombectomy Recommended: No  Decision to Treat Time for Tenecteplase:  (prior to arrival)    NIH Scale:  1a. Level of Consciousness: 0-->Alert, keenly responsive  1b. LOC Questions: 0-->Answers both questions correctly  1c. LOC Commands: 0-->Performs both tasks correctly  2. Best Gaze: 0-->Normal  3. Visual: 0-->No visual loss  4. Facial Palsy: 0-->Normal symmetrical movements  5a. Motor Arm, Left: 0-->No drift, limb holds 90 (or 45) degrees for full 10 secs  5b. Motor Arm, Right: 0-->No drift, limb holds 90 (or 45) degrees for full 10 secs  6a. Motor Leg, Left: 0-->No drift, leg holds 30 degree position for full 5 secs  6b. Motor Leg, Right: 0-->No drift, leg holds 30 degree position for full 5 secs  7. Limb Ataxia: 0-->Absent  8. Sensory: 0-->Normal, no sensory loss  9. Best Language: 0-->No aphasia, normal  10. Dysarthria: 0-->Normal  11. Extinction and Inattention (formerly Neglect): 0-->No abnormality  Total (NIH Stroke Scale): 0       Modified McCulloch Score: 0  Rj Coma Scale:15   ABCD2 Score:     [id number] Score:   HAS -BLED Score:   ICH Score:   Hunt & Lowry Classification:      Hemorrhagic change of an Ischemic Stroke: Does this patient have an ischemic stroke with hemorrhagic changes? No     Neurologic Chief Complaint: acute L sided weakness (body and facial) and dizziness    Subjective:     Interval History: Patient is seen for follow-up neurological assessment and treatment recommendations: Neuro exam stable. Losartan 25mg daily started. Patient will follow up with PCP on March 4, 2024.    HPI, Past Medical, Family, and Social History remains the same as documented in the initial encounter.     Review of Systems   Constitutional:  Negative for diaphoresis and fever.   HENT:  Negative for nosebleeds and rhinorrhea.    Eyes:  Negative for redness and itching.   Respiratory:  Negative for cough and stridor.    Gastrointestinal:  Negative for abdominal distention and vomiting.   Genitourinary:  Negative for difficulty urinating and hematuria.   Musculoskeletal:  Negative for joint swelling and neck stiffness.   Neurological:  Negative for facial asymmetry, speech difficulty, weakness and numbness.     Scheduled Meds:   aspirin  81 mg Oral Daily    atorvastatin  40 mg Oral Daily    clopidogreL  75 mg Oral Daily    heparin (porcine)  5,000 Units Subcutaneous Q8H    senna-docusate 8.6-50 mg  1 tablet Oral Daily     Continuous Infusions:  PRN Meds:acetaminophen, bisacodyL, hydrALAZINE, labetalol, melatonin    Objective:     Vital Signs (Most Recent):  Temp: 98.1 °F (36.7 °C) (02/29/24 0806)  Pulse: 95 (02/29/24 0806)  Resp: 16 (02/29/24 0806)  BP: (!) 169/110 (02/29/24 0806)  SpO2: 98 % (02/29/24 0806)  BP Location: Right arm    Vital Signs Range (Last 24H):  Temp:  [98 °F (36.7 °C)-98.8 °F (37.1 °C)]   Pulse:  []   Resp:  [14-23]   BP: (142-220)/()   SpO2:  [94 %-99 %]   BP Location: Right arm       Physical Exam  Vitals and nursing note reviewed.   Constitutional:       General: He is not in  acute distress.  HENT:      Head: Normocephalic and atraumatic.      Nose: Nose normal. No rhinorrhea.   Eyes:      General:         Right eye: No discharge.         Left eye: No discharge.      Conjunctiva/sclera: Conjunctivae normal.   Cardiovascular:      Rate and Rhythm: Normal rate.   Pulmonary:      Effort: Pulmonary effort is normal. No respiratory distress.   Musculoskeletal:         General: No tenderness or deformity.   Skin:     General: Skin is warm and dry.      Capillary Refill: Capillary refill takes less than 2 seconds.   Neurological:      Mental Status: He is alert.              Neurological Exam:   LOC: alert  Attention Span: Good     Laboratory:  CMP:   Recent Labs   Lab 02/29/24  0327   CALCIUM 8.9   ALBUMIN 3.7   PROT 7.1      K 4.2   CO2 20*      BUN 21*   CREATININE 1.0   ALKPHOS 68   ALT 40   AST 24   BILITOT 0.4         Diagnostic Results:  Brain/Vessel imaging:  MRI Brain w/o Contrast 2/28/24  Impression:  Small acute infarct in the medial right thalamus.  No significant surrounding edema or mass effect.  No acute intracranial hemorrhage.    CTA Stroke Multiphase 2/27/24  Normal CT of the head without with contrast. Normal CTA of the head and neck structures. Sinusitis change.     Cardiac Evaluation:   ECHO 2/28/24  Left Ventricle The left ventricle is normal in size. Ventricular mass is normal. Mildly increased wall thickness. There is concentric remodeling. Normal wall motion. There is normal systolic function with a visually estimated ejection fraction of 55 - 60%. There is normal diastolic function.   Right Ventricle Normal right ventricular cavity size. Systolic function is normal.   Left Atrium Normal left atrial size.   Right Atrium Normal right atrial size.   Aortic Valve The aortic valve is structurally normal. There is normal leaflet mobility. Aortic valve peak velocity is 1.03 m/s. Mean gradient is 3 mmHg.   Mitral Valve The mitral valve is structurally normal. There  is normal leaflet mobility.   Tricuspid Valve The tricuspid valve is structurally normal. There is normal leaflet mobility.   Pulmonic Valve The pulmonic valve is structurally normal.   IVC/SVC IVC was not well visualized due to poor acoustic window.   Ascending Aorta Ascending aorta is normal measuring 2.82 cm.       EKG 2/27/24 NSR    Magali Cameron NP  [facility] Stroke [facility]  Department of Vascular Neurology   [facility] [place])

## 2024-02-29 NOTE — NURSING
PIVs removed. Tele removed. Meds to bed are on the way from in patient pharmacy. Reviewed AVS with in depth discussion about strokes, causes, modifiable risk factors and treatment. Pt expressed understanding as he is very afraid of increased chances of another. States he will be getting an at home blood pressure monitor. He understands that if symptoms develop while driving that he needs to pull over not only for his safety but others. His cousin and mother are bedside and will be taking the patient home. His wife is aware of discharge.     Once medications are at the bedside, RN to escort pt off the unit.

## 2024-02-29 NOTE — SUBJECTIVE & OBJECTIVE
Neurologic Chief Complaint: acute L sided weakness (body and facial) and dizziness    Subjective:     Interval History: Patient is seen for follow-up neurological assessment and treatment recommendations: Neuro exam stable. Losartan 25mg daily started. Patient will follow up with PCP on March 4, 2024.    HPI, Past Medical, Family, and Social History remains the same as documented in the initial encounter.     Review of Systems   Constitutional:  Negative for diaphoresis and fever.   HENT:  Negative for nosebleeds and rhinorrhea.    Eyes:  Negative for redness and itching.   Respiratory:  Negative for cough and stridor.    Gastrointestinal:  Negative for abdominal distention and vomiting.   Genitourinary:  Negative for difficulty urinating and hematuria.   Musculoskeletal:  Negative for joint swelling and neck stiffness.   Neurological:  Negative for facial asymmetry, speech difficulty, weakness and numbness.     Scheduled Meds:   aspirin  81 mg Oral Daily    atorvastatin  40 mg Oral Daily    clopidogreL  75 mg Oral Daily    heparin (porcine)  5,000 Units Subcutaneous Q8H    senna-docusate 8.6-50 mg  1 tablet Oral Daily     Continuous Infusions:  PRN Meds:acetaminophen, bisacodyL, hydrALAZINE, labetalol, melatonin    Objective:     Vital Signs (Most Recent):  Temp: 98.1 °F (36.7 °C) (02/29/24 0806)  Pulse: 95 (02/29/24 0806)  Resp: 16 (02/29/24 0806)  BP: (!) 169/110 (02/29/24 0806)  SpO2: 98 % (02/29/24 0806)  BP Location: Right arm    Vital Signs Range (Last 24H):  Temp:  [98 °F (36.7 °C)-98.8 °F (37.1 °C)]   Pulse:  []   Resp:  [14-23]   BP: (142-220)/()   SpO2:  [94 %-99 %]   BP Location: Right arm       Physical Exam  Vitals and nursing note reviewed.   Constitutional:       General: He is not in acute distress.  HENT:      Head: Normocephalic and atraumatic.      Nose: Nose normal. No rhinorrhea.   Eyes:      General:         Right eye: No discharge.         Left eye: No discharge.       Conjunctiva/sclera: Conjunctivae normal.   Cardiovascular:      Rate and Rhythm: Normal rate.   Pulmonary:      Effort: Pulmonary effort is normal. No respiratory distress.   Musculoskeletal:         General: No tenderness or deformity.   Skin:     General: Skin is warm and dry.      Capillary Refill: Capillary refill takes less than 2 seconds.   Neurological:      Mental Status: He is alert.              Neurological Exam:   LOC: alert  Attention Span: Good     Laboratory:  CMP:   Recent Labs   Lab 02/29/24  0327   CALCIUM 8.9   ALBUMIN 3.7   PROT 7.1      K 4.2   CO2 20*      BUN 21*   CREATININE 1.0   ALKPHOS 68   ALT 40   AST 24   BILITOT 0.4         Diagnostic Results:  Brain/Vessel imaging:  MRI Brain w/o Contrast 2/28/24  Impression:  Small acute infarct in the medial right thalamus.  No significant surrounding edema or mass effect.  No acute intracranial hemorrhage.    CTA Stroke Multiphase 2/27/24  Normal CT of the head without with contrast. Normal CTA of the head and neck structures. Sinusitis change.     Cardiac Evaluation:   ECHO 2/28/24  Left Ventricle The left ventricle is normal in size. Ventricular mass is normal. Mildly increased wall thickness. There is concentric remodeling. Normal wall motion. There is normal systolic function with a visually estimated ejection fraction of 55 - 60%. There is normal diastolic function.   Right Ventricle Normal right ventricular cavity size. Systolic function is normal.   Left Atrium Normal left atrial size.   Right Atrium Normal right atrial size.   Aortic Valve The aortic valve is structurally normal. There is normal leaflet mobility. Aortic valve peak velocity is 1.03 m/s. Mean gradient is 3 mmHg.   Mitral Valve The mitral valve is structurally normal. There is normal leaflet mobility.   Tricuspid Valve The tricuspid valve is structurally normal. There is normal leaflet mobility.   Pulmonic Valve The pulmonic valve is structurally normal.   IVC/SVC  IVC was not well visualized due to poor acoustic window.   Ascending Aorta Ascending aorta is normal measuring 2.82 cm.       EKG 2/27/24 NSR

## 2024-02-29 NOTE — PLAN OF CARE
Problem: Adjustment to Illness (Stroke, Ischemic/Transient Ischemic Attack)  Goal: Optimal Coping  Outcome: Ongoing, Progressing     Problem: Adult Inpatient Plan of Care  Goal: Plan of Care Review  Outcome: Ongoing, Progressing  Goal: Patient-Specific Goal (Individualized)  Outcome: Ongoing, Progressing  Goal: Absence of Hospital-Acquired Illness or Injury  Outcome: Ongoing, Progressing  Goal: Optimal Comfort and Wellbeing  Outcome: Ongoing, Progressing  Goal: Readiness for Transition of Care  Outcome: Ongoing, Progressing     Problem: Functional Ability Impaired (Stroke, Ischemic/Transient Ischemic Attack)  Goal: Optimal Functional Ability  Outcome: Ongoing, Progressing     Problem: Swallowing Impairment (Stroke, Ischemic/Transient Ischemic Attack)  Goal: Optimal Eating and Swallowing without Aspiration  Outcome: Ongoing, Progressing

## 2024-02-29 NOTE — PLAN OF CARE
CHW met with patient/family at bedside. Patient experience rounding completed and reviewed the following.     Do you know your discharge plan? Yes or No,    If yes, what is the plan?   Yes Home     Have you discussed your needs and preferences with your SW/CM? Yes      If you are discharging home, do you have help at home? Yes     Do you think you will need help additional at home at discharge?   No     Do you currently have difficulty keeping up with bills, affording medicine or buying food?  No    Assigned SW/CM notified of any patient/family needs or concerns. Appropriate resources provided to address patient's needs.   Natty Mistry W  Case Management  [phone]

## 2024-02-29 NOTE — DISCHARGE SUMMARY
Roly Gee - Neurosurgery ([facility])  Vascular Neurology  Comprehensive Stroke Center  Discharge Summary     Summary:     Admit Date: 2/27/2024  9:30 AM    Discharge Date and Time:  02/29/2024 2:57 PM    Attending Physician: Herman Hunter MD     Discharge Provider: Magali Cameron NP    History of Present Illness: Kishore Silverio is a 48 year old man with PMH smoking, HTN (not on meds) transferred from [facility] 2/27 for post-TNK monitoring. This morning he experienced acute onset dizziness and left sided weakness while driving to work. W 2/27 0830 when symptoms began. Telestroke NIHSS 7. CTH with no acute abnormality and CTA negative for LVO. Received tenecteplase prior to transfer at 1043.    On arrival, patient reports his speech and left sided weakness have improved. He smokes 1/2 ppd.    Hospital Course (synopsis of major diagnoses, care, treatment, and services provided during the course of the hospital stay): 48 YOM w/HTN and tobacco use presented to [facility] 2/27 for acute L sided weakness (body and facial) and dizziness. CTH negative for bleed. TNK administered. Transferred to [facility] and admitted to [facility] for post TNK monitoring. MRI Brain revealed small acute infarct in the medial right thalamus. DAPT and atorvastatin 40mg initiated for primary and secondary stroke prevention. Amenable to quitting smoking, educated on smoking cessation. Neuro exam stable. Losartan 25mg daily started. Patient will follow up with PCP on March 4, 2024.    Goals of Care Treatment Preferences:  Code Status: Full Code      Stroke Etiology: Ischemic Small Vessel Disease ([place])    STROKE DOCUMENTATION   Acute Stroke Times   Last Known Normal Date: 02/27/24  Last Known Normal Time: 0830  Symptom Onset Date: 02/27/24  Symptom Onset Time: 0830  Stroke Team Called Date: 02/27/24  Stroke Team Called Time: 1211  Stroke Team Arrival Date: 02/27/24  Stroke Team Arrival Time: 1216  CT Interpretation Time:  (prior to  arrival)  Thrombolytic Therapy Recommended: Yes  CTA Interpretation Time:  (prior to arrival)  Thrombectomy Recommended: No  Decision to Treat Time for Tenecteplase:  (prior to arrival)     NIH Scale:  1a. Level of Consciousness: 0-->Alert, keenly responsive  1b. LOC Questions: 0-->Answers both questions correctly  1c. LOC Commands: 0-->Performs both tasks correctly  2. Best Gaze: 0-->Normal  3. Visual: 0-->No visual loss  4. Facial Palsy: 0-->Normal symmetrical movements  5a. Motor Arm, Left: 0-->No drift, limb holds 90 (or 45) degrees for full 10 secs  5b. Motor Arm, Right: 0-->No drift, limb holds 90 (or 45) degrees for full 10 secs  6a. Motor Leg, Left: 0-->No drift, leg holds 30 degree position for full 5 secs  6b. Motor Leg, Right: 0-->No drift, leg holds 30 degree position for full 5 secs  7. Limb Ataxia: 0-->Absent  8. Sensory: 0-->Normal, no sensory loss  9. Best Language: 0-->No aphasia, normal  10. Dysarthria: 0-->Normal  11. Extinction and Inattention (formerly Neglect): 0-->No abnormality  Total (NIH Stroke Scale): 0        Modified Letitia Score: 0  Rj Coma Scale:15   ABCD2 Score:    [id number] Score:   HAS -BLED Score:   ICH Score:   Hunt & Lowry Classification:       Assessment/Plan:     Diagnostic Results:      Brain/Vessel imaging:  MRI Brain w/o Contrast 2/28/24  Impression:  Small acute infarct in the medial right thalamus.  No significant surrounding edema or mass effect.  No acute intracranial hemorrhage.     CTA Stroke Multiphase 2/27/24  Normal CT of the head without with contrast. Normal CTA of the head and neck structures. Sinusitis change.     Cardiac Evaluation:   ECHO 2/28/24  Left Ventricle The left ventricle is normal in size. Ventricular mass is normal. Mildly increased wall thickness. There is concentric remodeling. Normal wall motion. There is normal systolic function with a visually estimated ejection fraction of 55 - 60%. There is normal diastolic function.   Right Ventricle  Normal right ventricular cavity size. Systolic function is normal.   Left Atrium Normal left atrial size.   Right Atrium Normal right atrial size.   Aortic Valve The aortic valve is structurally normal. There is normal leaflet mobility. Aortic valve peak velocity is 1.03 m/s. Mean gradient is 3 mmHg.   Mitral Valve The mitral valve is structurally normal. There is normal leaflet mobility.   Tricuspid Valve The tricuspid valve is structurally normal. There is normal leaflet mobility.   Pulmonic Valve The pulmonic valve is structurally normal.   IVC/SVC IVC was not well visualized due to poor acoustic window.   Ascending Aorta Ascending aorta is normal measuring 2.82 cm.         EKG 2/27/24 NSR       Interventions: IV Thrombolytic    Complications: None    Disposition: Home or Self Care    Final Active Diagnoses:    Diagnosis Date Noted POA    PRINCIPAL PROBLEM:  Cerebrovascular accident (CVA) due to thrombosis of right posterior cerebral artery [I63.331] 02/28/2024 Yes    Hemiparesis as late effect of cerebrovascular disease [I69.959] 02/28/2024 Not Applicable    Hypertensive emergency [I16.1] 02/28/2024 Yes    Acute left-sided weakness [R53.1] 02/27/2024 Yes    Tobacco use [Z72.0] 02/27/2024 Yes     Chronic    HTN (hypertension) [I10] 02/27/2024 Yes     Chronic    S/P admn tPA in diff fac w/n last 24 hr bef adm to crnt fac [Z92.82] 02/27/2024 Not Applicable      Problems Resolved During this Admission:     No new Assessment & Plan notes have been filed under this hospital service since the last note was generated.  Service: Vascular Neurology      Recommendations:     Post-discharge complication risks: None    Stroke Education given to: patient and family    Follow-up in Stroke Clinic in 4-6 weeks.     Discharge Plan:  Antithrombotics: Aspirin 81mg, Clopidogrel 75mg  Statin: Atorvastatin 40mg  Smoking Cessation  Aggresive risk factor modification:  Hypertension  Smoking    Follow Up:   Follow-up Information        Bank, Frankie Mehrdad, MD. Go on 3/4/2024.    Specialty: Internal Medicine  Why: Hospital follow up, Establish Primary Care at 10:20 AM  Contact information:  [address]  [facility] [address]  [facility] [address]  [phone]               [facility] VASCULAR NEUROLOGY Follow up in 4 week(s).    Specialty: Vascular Neurology  Why: A representative from the Vascular Neurology Clinic will call to set up a hospital follow-up appointment.  Contact information:  [address]  [facility] [address]  [phone]                           Patient Instructions:      Ambulatory referral/consult to Vascular Neurology   Standing Status: Future   Referral Priority: Routine Referral Type: Consultation   Referral Reason: Specialty Services Required   Requested Specialty: Vascular Neurology   Number of Visits Requested: 1     Ambulatory referral/consult to Smoking Cessation Program   Standing Status: Future   Referral Priority: Routine Referral Type: Consultation   Referral Reason: Specialty Services Required   Requested Specialty: CTTS   Number of Visits Requested: 1     Diet Cardiac   Order Comments: See Stroke Patient Education Guide Booklet for details.     Call 911 for any of the following:   Order Comments: Call 911  right away if any of the following warning signs come on suddenly, even if the symptoms only last for a few minutes. With stroke, timing is very important.   - Warning Signs of Stroke:  - Weakness: You may feel a sudden weakness, tingling or loss of feeling on one side of your face or body.  - Vision Problems: You may have sudden double vision or trouble seeing in one or both eyes.  - Speech Problems: You may have sudden trouble talking, slured speech, or problems understanding others.  - Headache: You may have sudden, severe headache.  - Movement Problems: You may experience dizziness, a feeling of spinning, a loss of balance, a feeling of falling or blackouts.     Activity as tolerated        Medications:  Reconciled Home Medications:      Medication List        START taking these medications      aspirin 81 MG EC tablet  Commonly known as: ECOTRIN  Take 1 tablet (81 mg total) by mouth once daily.  Start taking on: March 1, 2024     atorvastatin 40 MG tablet  Commonly known as: LIPITOR  Take 1 tablet (40 mg total) by mouth once daily.  Start taking on: March 1, 2024     clopidogreL 75 mg tablet  Commonly known as: PLAVIX  Take 1 tablet (75 mg total) by mouth once daily. for 21 days  Start taking on: March 1, 2024     losartan 25 MG tablet  Commonly known as: COZAAR  Take 1 tablet (25 mg total) by mouth once daily. Please follow up with your PCP regarding blood pressure management.  Start taking on: March 1, 2024            CONTINUE taking these medications      albuterol 90 mcg/actuation inhaler  Commonly known as: PROVENTIL/VENTOLIN HFA  Inhale 2 puffs into the lungs every 6 (six) hours as needed for Wheezing. Rescue     benzonatate 200 MG capsule  Commonly known as: TESSALON  Take 1 capsule (200 mg total) by mouth 3 (three) times daily as needed for Cough.     budesonide 180mcg 180 mcg/actuation Aepb  Commonly known as: PULMICORT 180mcg  Inhale 2 puffs into the lungs once daily. Controller for 10 days     ondansetron 8 MG Tbdl  Commonly known as: ZOFRAN-ODT  Take 1 tablet (8 mg total) by mouth every 6 (six) hours as needed.     promethazine-dextromethorphan 6.25-15 mg/5 mL Syrp  Commonly known as: PROMETHAZINE-DM  Take 5 mLs by mouth nightly as needed. 5 mL every 4 to 6 hours; maximum: 30 mL in 24 hours              Magali Cameron NP  [facility] Stroke Center  Department of Vascular Neurology   [facility] Neurosurgery [facility])

## 2024-02-29 NOTE — ASSESSMENT & PLAN NOTE
Kishore Silverio is a 48 y.o. male 8 year old man with smoking, HTN (not on meds) presented to [facility] 2/27 for acute left sided weakness. Telestroke NIHSS 7. CTH no acute abnormality and CTA negative for LVO. Received TNK prior to transfer. Arrival NIHSS 3 with improvement in LSW. To be admitted to [facility] for post-TNK monitoring. MRI showed small acute infarct in the medial right thalamus.     Patient stepped down to NPU over night. Neuro exam stable. Losartan 25mg daily started. Patient will follow up with PCP on March 4, 2024.      Antithrombotics for secondary stroke prevention: Antiplatelets: Aspirin: 81 mg daily  Clopidogrel: 75 mg daily    Statins for secondary stroke prevention and hyperlipidemia, if present:   Statins: Atorvastatin- 40 mg daily    Aggressive risk factor modification: HTN, Smoking, Diet, Exercise     Rehab efforts: The patient has been evaluated by a stroke team provider and the therapy needs have been fully considered based off the presenting complaints and exam findings. The following therapy evaluations are needed: PT evaluate and treat, OT evaluate and treat, SLP evaluate and treat, PM&R evaluate for appropriate placement    Diagnostics ordered/pending: None     VTE prophylaxis: Heparin 5000 units SQ every 8 hours  Mechanical prophylaxis: Place SCDs    BP parameters: Infarct: Post Thrombolytic therapy, SBP <180

## 2024-02-29 NOTE — PLAN OF CARE
Roly Gee - Neurosurgery (Hospital)  Discharge Final Note    Primary Care Provider: No primary care provider on file.    Expected Discharge Date: 2/29/2024    Final Discharge Note (most recent)       Final Note - 02/29/24 1500          Final Note    Assessment Type Final Discharge Note (P)      Anticipated Discharge Disposition Home or Self Care (P)         Post-Acute Status    Post-Acute Authorization Other (P)      Other Status No Post-Acute Service Needs (P)                  Patient discharging home.  No post acute needs.      Natty Berger LMSW [facility]  [phone]      Future Appointments   Date Time Provider Department Center   3/4/2024 10:20 AM Frankie Ordaz MD [facility]              Contact Info       Frankie Ordaz MD   Specialty: Internal Medicine    [address] [address]   Phone: [phone]       Next Steps: Go on 3/4/2024    Instructions: Hospital follow up, Establish Primary Care at 10:20 AM    [facility] VASCULAR NEUROLOGY   Specialty: Vascular Neurology    [address] [facility] [address]   Phone: [phone]       Next Steps: Follow up in 4 week(s)    Instructions: A representative from the Vascular Neurology Clinic will call to set up a hospital follow-up appointment.

## 2024-03-01 ENCOUNTER — TELEPHONE (OUTPATIENT)
Dept: NEUROLOGY | Facility: CLINIC | Age: 49
End: 2024-03-01

## 2024-03-01 NOTE — TELEPHONE ENCOUNTER
Called and left voice mail. Informed patient he is scheduled with Dr. Mooney 3/28 1500. Advised patient to call back if he needs to reschedule.   Appointment letter sent.

## 2024-03-04 ENCOUNTER — OFFICE VISIT (OUTPATIENT)
Dept: INTERNAL MEDICINE | Facility: CLINIC | Age: 49
End: 2024-03-04
Payer: COMMERCIAL

## 2024-03-04 ENCOUNTER — PATIENT MESSAGE (OUTPATIENT)
Dept: NEUROLOGY | Facility: HOSPITAL | Age: 49
End: 2024-03-04
Payer: COMMERCIAL

## 2024-03-04 VITALS
BODY MASS INDEX: 30.23 KG/M2 | OXYGEN SATURATION: 98 % | HEART RATE: 74 BPM | HEIGHT: 70 IN | WEIGHT: 211.19 LBS | DIASTOLIC BLOOD PRESSURE: 101 MMHG | SYSTOLIC BLOOD PRESSURE: 154 MMHG

## 2024-03-04 DIAGNOSIS — Z09 HOSPITAL DISCHARGE FOLLOW-UP: ICD-10-CM

## 2024-03-04 DIAGNOSIS — I63.331 CEREBROVASCULAR ACCIDENT (CVA) DUE TO THROMBOSIS OF RIGHT POSTERIOR CEREBRAL ARTERY: ICD-10-CM

## 2024-03-04 DIAGNOSIS — I10 PRIMARY HYPERTENSION: Chronic | ICD-10-CM

## 2024-03-04 DIAGNOSIS — Z12.11 COLON CANCER SCREENING: ICD-10-CM

## 2024-03-04 DIAGNOSIS — R73.03 PREDIABETES: Primary | ICD-10-CM

## 2024-03-04 PROCEDURE — 99999 PR PBB SHADOW E&M-EST. PATIENT-LVL III: CPT | Mod: PBBFAC,,, | Performed by: STUDENT IN AN ORGANIZED HEALTH CARE EDUCATION/TRAINING PROGRAM

## 2024-03-04 PROCEDURE — 99396 PREV VISIT EST AGE 40-64: CPT | Mod: S$GLB,,, | Performed by: STUDENT IN AN ORGANIZED HEALTH CARE EDUCATION/TRAINING PROGRAM

## 2024-03-04 RX ORDER — LOSARTAN POTASSIUM 50 MG/1
50 TABLET ORAL DAILY
Qty: 30 TABLET | Refills: 0 | Status: SHIPPED | OUTPATIENT
Start: 2024-03-04 | End: 2024-04-09 | Stop reason: SDUPTHER

## 2024-03-04 RX ORDER — ATORVASTATIN CALCIUM 40 MG/1
40 TABLET, FILM COATED ORAL DAILY
Qty: 30 TABLET | Refills: 2 | Status: SHIPPED | OUTPATIENT
Start: 2024-03-04 | End: 2024-06-02

## 2024-03-04 NOTE — PROCEDURES
24 hr. Video EEG Monitoring    Date/Time: 2/27/2024 9:30 AM    Performed by: Leo Henry MD  Authorized by: La Newell PA-C        ICU EEG/VIDEO MONITORING REPORT    DATE OF SERVICE: 2/26/24  EEG NUMBER: [id number]   REQUESTED BY: Reece  LOCATION OF SERVICE: [facility]    METHODOLOGY   Electroencephalographic (EEG) recording is with electrodes placed according to the International 10-20 placement system.  Thirty two (32) channels of digital signal are simultaneously recorded from the scalp and may include EKG, EMG, and/or eye monitors.   Recording band pass was 0.1 to 512 hz.  Digital video recording of the patient is simultaneously recorded with the EEG.  The nursing staff report clinical symptoms and may press an event button when the patient has symptoms of clinical interest to the treating physicians.  EEG and video recording is stored and archived in digital format.  The entire recording is visually reviewed and the times identified by computer analysis as being spikes or seizures are reviewed again.  Activation procedures which include photic stimulation, hyperventilation and instructing patients to perform simple task are done in selected patients.   Compresses spectral analysis (CSA) is also performed on the activity recorded from each individual channel.  This is displayed as a power display of frequencies from 0 to 30 Hz over time.   The CSA analysis is done and displayed continuously.  This is reviewed for asymmetries in power between homologous areas of the scalp and for presence of changes in power which canbe seen when seizures occur.  Sections of suspected abnormalities on the CSA is then compared with the original EEG recording.     [employer]avis software was also utilized in the review of this study.  This software suite analyzes the EEG recording in multiple domains.  Coherence and rhythmicity is computed to identify EEG sections which may contain organized seizures.  Each channel undergoes analysis to  detect presence of spike and sharp waves which have special and morphological characteristic of epileptic activity.  The routine EEG recording is converted from spacial into frequency domain.  This is then displayed comparing homologous areas to identify areas of significant asymmetry.  Algorithm to identify non-cortically generated artifact is used to separate eye movement, EMG and other artifact from the EEG.      Recording Times  Start on 2/26/24 at 17:40:03  Stop on 2/26/24 at 23:41:31  A total of 6 hours of EEG was recorded.    EEG FINDINGS  The record shows a fair  organization at rest, consisting of a 7 Hz posterior dominant rhythm with poor reactivity. There is mild bilateral beta activity.    Drowsiness is characterized by attenuation of the background, vertex waves, and further bilateral theta slowing.     Provocative maneuvers including hyperventilation and photic stimulation were not performed.     EKG recording shows a regular rhythm.    There is no push button or clinical event.    IMPRESSION:  Abnormal study due to mild to moderate  diffuse background slowing consistent with diffuse cerebral dysfunction and encephalopathy which may be on the basis of toxic, metabolic, or primary neuronal disorder.

## 2024-03-04 NOTE — PROGRESS NOTES
[facility]    Subjective:         Patient ID: Kishore Silverio is a 48 y.o. male.    Chief Complaint: Hospital Follow up    History was obtained from the patient and supplemented through chart review.    HPI:  Patient is a 48 y.o. male who presents hopital follow up following a CVA.     He was recently hospitalized for acute thalamic CVA.  He received tPA with improvement of symptoms however he still reports that when walking more than a few steps he experieicnes some dizziness.  He feels like he is not in control and that he may fall over.  He states it feels similar to when he would stand up after having a few drinks in the past.    Patient has a history of tobacco use and elevated blood pressure.  He was also diagnosed with prediabetes during this hospital visit.  Additionally patient has what sounds like mild intermittent asthma. He reports sometimes in the middle of the night he can't breath. Reaches for his albuterol inhaler and this resolves the symptoms. This mostly happens after he ate too much and and is suffering from acid reflux.     Started smoking at 18 years old. 3/4 pack per day. Has not smoked since leaving the hospital. Has not really been out of the house since leaving [facility]. Has cravings in  streesfull situations, others smoking around him and talking a lot. Feels like he will be able handle these cravings using nictoine gum.     Regarding his diet he endorses drinking a lot of sugary sodas and adding sure to his coffee in the morning.  Reports he was addicted to caffeine.  Otherwise eats chicken vegetables but also white rice and white bread.     Works in [email or web address].   Etoh; Not currently.   Occasional marijuana.   Exercise: Typically runs but this is periodic. Was last training for a half marathon in November.     Tried colonoscopy a year ago but couldn't finish the prep. Ordered by a friend who is a GI doc.    Medical History  Past Medical History:   Diagnosis  Date    HTN (hypertension)     HTN (hypertension) 2/27/2024    Tobacco use          Surgical hx, family hx, social hx   Family History   Problem Relation Age of Onset    No Known Problems Mother     No Known Problems Father      Past Surgical History:   Procedure Laterality Date    CYST REMOVAL       Social History     Socioeconomic History    Marital status:    Tobacco Use    Smoking status: Former     Types: Cigarettes     Start date: 2/27/2024    Smokeless tobacco: Never   Substance and Sexual Activity    Alcohol use: Yes    Drug use: No    Sexual activity: Yes     Social Determinants of Health     Financial Resource Strain: Low Risk  (2/28/2024)    Overall Financial Resource Strain (CARDIA)     Difficulty of Paying Living Expenses: Not hard at all   Food Insecurity: No Food Insecurity (2/28/2024)    Hunger Vital Sign     Worried About Running Out of Food in the Last Year: Never true     Ran Out of Food in the Last Year: Never true   Transportation Needs: No Transportation Needs (2/28/2024)    PRAPARE - Transportation     Lack of Transportation (Medical): No     Lack of Transportation (Non-Medical): No   Physical Activity: Inactive (2/28/2024)    Exercise Vital Sign     Days of Exercise per Week: 0 days     Minutes of Exercise per Session: 0 min   Stress: No Stress Concern Present (2/28/2024)    [place] of Occupational [employer] - Occupational Stress Questionnaire     Feeling of Stress : Not at all   Social Connections: Moderately Isolated (2/28/2024)    Social Connection and Isolation Panel [NHANES]     Frequency of Communication with Friends and Family: Once a week     Frequency of Social Gatherings with Friends and Family: More than three times a week     Attends Bahai Services: Never     Active Member of Clubs or Organizations: No     Attends Club or Organization Meetings: Never     Marital Status:    Housing Stability: Low Risk  (2/28/2024)    Housing Stability Vital Sign     Unable  "to Pay for Housing in the Last Year: No     Number of Places Lived in the Last Year: 1     Unstable Housing in the Last Year: No       There is no immunization history on file for this patient.    Current Outpatient Medications   Medication Instructions    albuterol (PROVENTIL/VENTOLIN HFA) 90 mcg/actuation inhaler 2 puffs, Inhalation, Every 6 hours PRN, Rescue    aspirin (ECOTRIN) 81 mg, Oral, Daily    atorvastatin (LIPITOR) 40 mg, Oral, Daily    clopidogreL (PLAVIX) 75 mg, Oral, Daily    losartan (COZAAR) 50 mg, Oral, Daily, Please follow up with your PCP regarding blood pressure management.    ondansetron (ZOFRAN-ODT) 8 mg, Oral, Every 6 hours PRN    promethazine-dextromethorphan (PROMETHAZINE-DM) 6.25-15 mg/5 mL Syrp 5 mLs, Oral, Nightly PRN, 5 mL every 4 to 6 hours; maximum: 30 mL in 24 hours     Objective:        Body mass index is 30.3 kg/m².  Vitals:    03/04/24 1023   BP: (!) 154/101   Pulse: 74   SpO2: 98%   Weight: 95.8 kg (211 lb 3.2 oz)   Height: 5' 10" (1.778 m)   PainSc: 0-No pain     Physical Exam  Constitutional:       Appearance: Normal appearance.   Eyes:      Extraocular Movements: Extraocular movements intact.      Pupils: Pupils are equal, round, and reactive to light.   Cardiovascular:      Rate and Rhythm: Normal rate.      Heart sounds: No murmur heard.  Pulmonary:      Effort: Pulmonary effort is normal. No respiratory distress.      Breath sounds: No wheezing.   Abdominal:      General: Abdomen is flat.   Musculoskeletal:      Right lower leg: No edema.      Left lower leg: No edema.   Skin:     General: Skin is warm and dry.   Neurological:      General: No focal deficit present.      Mental Status: He is alert.      Sensory: No sensory deficit.      Gait: Gait normal.   Psychiatric:         Mood and Affect: Mood normal.           Lab Results   Component Value Date    WBC 6.31 02/29/2024    HGB 15.5 02/29/2024    HCT 45.7 02/29/2024     02/29/2024    CHOL 211 (H) 02/27/2024    TRIG " 221 (H) 02/27/2024    HDL 46 02/27/2024    ALT 40 02/29/2024    AST 24 02/29/2024     02/29/2024    K 4.2 02/29/2024     02/29/2024    CREATININE 1.0 02/29/2024    BUN 21 (H) 02/29/2024    CO2 20 (L) 02/29/2024    TSH 3.929 02/27/2024    INR 1.0 02/27/2024    HGBA1C 6.3 (H) 02/27/2024       The ASCVD Risk score (Dalton GUILLEN, et al., 2019) failed to calculate for the following reasons:    The patient has a prior MI or stroke diagnosis    Assessment:         1. Prediabetes    2. Colon cancer screening    3. Cerebrovascular accident (CVA) due to thrombosis of right posterior cerebral artery    4. Primary hypertension    5. Hospital discharge follow-up          Plan:     Doing well today but still has residual dizziness.  Advised that this may slowly improve over time.  Defer further management to Neurology.  Increase losartan to 50 mg daily based on elevated blood pressure today.  Patient and wife states this correlates with home readings.      Discussed cutting out all sweetened drinks and making healthier diet changes as well as restarting exercise for management of prediabetes.  Follow up in 2-3 months.  Can recheck A1c at that time and consider starting metformin if needed.    Discussed pathophysiology and secondary prevention of CVA.  Patient will need lifelong statin and aspirin.  Is currently on twenty-one days of Plavix for low risk stroke.  Has neurology follow up scheduled    Prediabetes  -     Hemoglobin A1C; Future; Expected date: 03/04/2024  -     COMPREHENSIVE METABOLIC PANEL; Future; Expected date: 03/04/2024    Colon cancer screening  -     Cologuard Screening (Multitarget Stool DNA); Future; Expected date: 03/04/2024    Cerebrovascular accident (CVA) due to thrombosis of right posterior cerebral artery  -     atorvastatin (LIPITOR) 40 MG tablet; Take 1 tablet (40 mg total) by mouth once daily.  Dispense: 30 tablet; Refill: 2  -     LIPID PANEL; Future; Expected date: 03/04/2024    Primary  hypertension  -     losartan (COZAAR) 50 MG tablet; Take 1 tablet (50 mg total) by mouth once daily. Please follow up with your PCP regarding blood pressure management.  Dispense: 30 tablet; Refill: 0    Hospital discharge follow-up          Health Maintenance    Tests to Keep You Healthy    Colon Cancer Screening: ORDERED  Last Blood Pressure <= 139/89 (3/4/2024): NO    Follow up in about 2 months (around 5/4/2024). or sooner prn        Frankie Cronin Bank [facility]  [facility] [address]  Phone [phone]  Fax [phone]    This note is dictated using the M*Modal Fluency Direct word recognition program. It may contain word recognition mistakes or wrong word substitutions that were missed on review.

## 2024-03-05 ENCOUNTER — TELEPHONE (OUTPATIENT)
Dept: INTERNAL MEDICINE | Facility: CLINIC | Age: 49
End: 2024-03-05
Payer: COMMERCIAL

## 2024-03-05 NOTE — TELEPHONE ENCOUNTER
Patient had appt yesterday, calling today to give his insurance information to Mara the lady at the front who checked him in. Call transferred to Mara

## 2024-03-05 NOTE — TELEPHONE ENCOUNTER
----- Message from Gardenia Blake sent at 3/5/2024  8:53 AM CST -----  Regarding: Patient Advice              Name of Who is Calling:  Kishore Silverio    Who Left The Message  Kishore Silverio      What is the request in detail: Patient called requesting a call pertaining to his Office visit on yesterday 03/034/2024 with Dr. Frankie Ordaz MD. Patient has questions and concerns.  Please give a call back at your earliest convenience and further advise. Thank you      Reply by [facility]:  NO      Preferred Call Back : [phone] (S)

## 2024-03-06 ENCOUNTER — HOME CARE VISIT (OUTPATIENT)
Dept: NEUROLOGY | Facility: HOSPITAL | Age: 49
End: 2024-03-06
Payer: COMMERCIAL

## 2024-03-06 VITALS — SYSTOLIC BLOOD PRESSURE: 152 MMHG | DIASTOLIC BLOOD PRESSURE: 94 MMHG

## 2024-03-06 NOTE — PROGRESS NOTES
Phone visit. Mr. Silverio is a very pleasant 47 yo M. Introduced to Stroke Mobile enrollment. Pt declined home visits. Agreed to phone visits. Pt states he is progressing well. Quit smoking. He states he feels he has no deficits, feels he resumed normal ADLs, driving, but, intellect is slower, unable to multitask as before CVA. He states he often feels lightheaded, and occasional headaches. He reports his BP consistently runs high. (He said 180/120). While conversing with me, he was able to take his BP , it was 154/94. He went to see his PCP 2 days ago, his losartan was increased from 25mg PO daily to 50 mg PO daily but he was not yet notified by [facility] that this new Rx was ready therefore has not picked it up yet or started it. I educated on HTN management, keeping a BP log, and that he may take (2) 25 mg tabs of losartan daily until he picks up new 50 mg Rx then only take one 50 mg tab daily. Educated on all meds. I educated of post stroke management and care. He verbalized understanding. He is aware of his neuro FU appt 3/28/24 @1500.

## 2024-03-18 ENCOUNTER — CLINICAL SUPPORT (OUTPATIENT)
Dept: SMOKING CESSATION | Facility: CLINIC | Age: 49
End: 2024-03-18
Payer: COMMERCIAL

## 2024-03-18 DIAGNOSIS — F17.200 NICOTINE DEPENDENCE: Primary | ICD-10-CM

## 2024-03-18 NOTE — PROGRESS NOTES
Patient was seen virtually today and reports that he has been tobacco free since his stroke over two 2 weeks ago. Commended patient on his hard work and dedication to this quit attempt. Patient reports that he has been using 4mg nicotine gum to help with his current craves. No refill is needed at this time. Patient reports that he notices that he desires to smoke after doing activities that take lots of brain work. Discussed tips and strategies to assist with patient remaining quit. Patient reports that since his stroke he has a change in lifestyle and has been eating very clean, his wife hold him accountable in this area. The patient will continue with  therapy sessions and medication monitoring by CTTS. Prescribed medication management will be by physician.

## 2024-04-02 ENCOUNTER — TELEPHONE (OUTPATIENT)
Dept: NEUROLOGY | Facility: CLINIC | Age: 49
End: 2024-04-02
Payer: COMMERCIAL

## 2024-04-02 NOTE — TELEPHONE ENCOUNTER
----- Message from Gisel Sharma sent at 4/2/2024  8:32 AM CDT -----  Regarding: rhonda appt  Contact: @  [phone]  Pt is calling to reschedule appointment from 03/28 ...no available dates Pleaes call and adv @  [phone]

## 2024-04-05 ENCOUNTER — PATIENT OUTREACH (OUTPATIENT)
Dept: ADMINISTRATIVE | Facility: HOSPITAL | Age: 49
End: 2024-04-05
Payer: COMMERCIAL

## 2024-04-08 ENCOUNTER — TELEPHONE (OUTPATIENT)
Dept: NEUROLOGY | Facility: CLINIC | Age: 49
End: 2024-04-08
Payer: COMMERCIAL

## 2024-04-08 NOTE — PROGRESS NOTES
V[facility] Neurology Clinic    Impression:  History of R thalamocapsular lacune (small vessel etiology) 2/27/24 s/p TNK  Dyslipidemia  HTN: uncontrolled  Tobacco abuse: quit Feb '24    Stroke risk factors: stroke, HTN, HLD, tobacco, pre-diabetes      Plan:  Continue ASA 81mg/d  Continue atorvastatin 40mg/d for now; target LDL <70mg/dL (see below)  F/U FLP, CMP as ordered by Dr. Ordaz  As stroke etiology is likely small vessel, will not pursue bubble study; d/w patient  Encouraged continued smoking cessation  Exercise and diet discusse  I refilled losartan 50mg/d and asked him to f/u with Dr. Ordaz.  We discussed the importance of BP control to reduce recurrent stroke risk.  Referral to psychology for relaxation techniques  RTC prn      Problem List Items Addressed This Visit          1 - High    History of ischemic stroke in prior three months - Primary    Relevant Orders    Hepatic function panel       2     Tobacco use (Chronic)    HTN (hypertension) (Chronic)    Relevant Medications    losartan (COZAAR) 50 MG tablet    Dyslipidemia     Other Visit Diagnoses       Anxiety        Relevant Orders    Ambulatory referral/consult to Psychology            CC: stroke hospital f/u    HPI:  49 y/o M who was hospitalized 2/27/24 for R thalamocapsular stroke (see below).   He was discharged with NIHSS score of 0.  No recurrent stroke symptoms.  He tolerated DAPT (now on ASA monotherapy) and is tolerating atorva 40mg/d.  He is out of losartan.     No STEVEN symptoms      From D/C Summary:  Kishore Silverio is a 48 year old man with PMH smoking, HTN (not on meds) transferred from [facility] 2/27 for post-TNK monitoring. This morning he experienced acute onset dizziness and left sided weakness while driving to work. [facility] [place] 2/27 0830 when symptoms began. Telestroke NIHSS 7. CTH with no acute abnormality and CTA negative for LVO. Received tenecteplase prior to transfer at 1043.     On arrival, patient reports his speech and left sided weakness  have improved. He smokes 1/2 ppd.     Hospital Course (synopsis of major diagnoses, care, treatment, and services provided during the course of the hospital stay): 48 YOM w/HTN and tobacco use presented to [facility] 2/27 for acute L sided weakness (body and facial) and dizziness. CTH negative for bleed. TNK administered. Transferred to [facility] and admitted to [facility] for post TNK monitoring. MRI Brain revealed small acute infarct in the medial right thalamus. DAPT and atorvastatin 40mg initiated for primary and secondary stroke prevention. Amenable to quitting smoking, educated on smoking cessation. Neuro exam stable. Losartan 25mg daily started. Patient will follow up with PCP on March 4, 2024.     Goals of Care Treatment Preferences:  Code Status: Full Code        Stroke Etiology: Ischemic Small Vessel Disease ([place])     STROKE DOCUMENTATION   Acute Stroke Times   Last Known Normal Date: 02/27/24  Last Known Normal Time: 0830  Symptom Onset Date: 02/27/24  Symptom Onset Time: 0830  Stroke Team Called Date: 02/27/24  Stroke Team Called Time: 1211  Stroke Team Arrival Date: 02/27/24  Stroke Team Arrival Time: 1216  CT Interpretation Time:  (prior to arrival)  Thrombolytic Therapy Recommended: Yes  CTA Interpretation Time:  (prior to arrival)  Thrombectomy Recommended: No  Decision to Treat Time for Tenecteplase:  (prior to arrival)      NIH Scale:  1a. Level of Consciousness: 0-->Alert, keenly responsive  1b. LOC Questions: 0-->Answers both questions correctly  1c. LOC Commands: 0-->Performs both tasks correctly  2. Best Gaze: 0-->Normal  3. Visual: 0-->No visual loss  4. Facial Palsy: 0-->Normal symmetrical movements  5a. Motor Arm, Left: 0-->No drift, limb holds 90 (or 45) degrees for full 10 secs  5b. Motor Arm, Right: 0-->No drift, limb holds 90 (or 45) degrees for full 10 secs  6a. Motor Leg, Left: 0-->No drift, leg holds 30 degree position for full 5 secs  6b. Motor Leg, Right: 0-->No drift, leg holds 30 degree  position for full 5 secs  7. Limb Ataxia: 0-->Absent  8. Sensory: 0-->Normal, no sensory loss  9. Best Language: 0-->No aphasia, normal  10. Dysarthria: 0-->Normal  11. Extinction and Inattention (formerly Neglect): 0-->No abnormality  Total (NIH Stroke Scale): 0           Modified Letitia Score: 0  [place] Coma Scale:15   ABCD2 Score:    [id number] Score:   HAS -BLED Score:   ICH Score:   Hunt & Lowry Classification:         Assessment/Plan:         Interventions: IV Thrombolytic     Complications: None     Disposition: Home or Self Care      Recommendations:      Post-discharge complication risks: None     Stroke Education given to: patient and family     Follow-up in Stroke Clinic in 4-6 weeks.      Discharge Plan:  Antithrombotics: Aspirin 81mg, Clopidogrel 75mg  Statin: Atorvastatin 40mg  Smoking Cessation  Aggresive risk factor modification:  Hypertension  Smoking        Past Medical History:   Diagnosis Date    HTN (hypertension)     HTN (hypertension) 2/27/2024    Tobacco use       Past Surgical History:   Procedure Laterality Date    CYST REMOVAL        Outpatient Medications Marked as Taking for the 4/9/24 encounter (Office Visit) with Mauricio Mooney MD   Medication Sig Dispense Refill    aspirin (ECOTRIN) 81 MG EC tablet Take 1 tablet (81 mg total) by mouth once daily. 90 tablet 3    atorvastatin (LIPITOR) 40 MG tablet Take 1 tablet (40 mg total) by mouth once daily. 30 tablet 2    ondansetron (ZOFRAN-ODT) 8 MG TbDL Take 1 tablet (8 mg total) by mouth every 6 (six) hours as needed. 30 tablet 0      Review of patient's allergies indicates:   Allergen Reactions    Lavender       Family History   Problem Relation Age of Onset    No Known Problems Mother     No Known Problems Father       Social History     Socioeconomic History    Marital status:    Tobacco Use    Smoking status: Former     Types: Cigarettes     Start date: 2/27/2024    Smokeless tobacco: Never   Substance and Sexual Activity  "   Alcohol use: Yes    Drug use: No    Sexual activity: Yes     Social Determinants of Health     Financial Resource Strain: Low Risk  (2/28/2024)    Overall Financial Resource Strain (CARDIA)     Difficulty of Paying Living Expenses: Not hard at all   Food Insecurity: No Food Insecurity (2/28/2024)    Hunger Vital Sign     Worried About Running Out of Food in the Last Year: Never true     Ran Out of Food in the Last Year: Never true   Transportation Needs: No Transportation Needs (2/28/2024)    PRAPARE - Transportation     Lack of Transportation (Medical): No     Lack of Transportation (Non-Medical): No   Physical Activity: Inactive (2/28/2024)    Exercise Vital Sign     Days of Exercise per Week: 0 days     Minutes of Exercise per Session: 0 min   Stress: No Stress Concern Present (2/28/2024)    [place] of Occupational Health - Occupational Stress Questionnaire     Feeling of Stress : Not at all   Social Connections: Moderately Isolated (2/28/2024)    Social Connection and Isolation Panel [NHANES]     Frequency of Communication with Friends and Family: Once a week     Frequency of Social Gatherings with Friends and Family: More than three times a week     Attends Pentecostal Services: Never     Active Member of Clubs or Organizations: No     Attends Club or Organization Meetings: Never     Marital Status:    Housing Stability: Low Risk  (2/28/2024)    Housing Stability Vital Sign     Unable to Pay for Housing in the Last Year: No     Number of Places Lived in the Last Year: 1     Unstable Housing in the Last Year: No     BP (!) 152/109   Pulse 82   Ht 5' 10" (1.778 m)   Wt 102.6 kg (226 lb 3.1 oz)   BMI 32.46 kg/m²    Well developed, well nourished male  Extremities: no edema    Mental status:   Awake, alert and appropriately oriented   Normal recent and remote memory   Normal attention and concentration   Normal speech and language   Normal fund of knowledge   No extinction  Cranial " nerves:   Normal funduscopic - discs sharp   PERRLA   EOMF without nystagmus   VFF   Normal facial sensation   Normal facial movements   Intact hearing bilaterally   Palate elevates symmetrically   Normal SCM and trapezius strength   Tongue midline  Motor:   No pronator drift   Normal FF movements bilaterally   Normal muscle tone, bulk and power   No abnormal movements  Sensory   Intact to LT  DTRs   2+ and symmetric   Plantar responses NT  Coordination   Intact to FNF, RAH, and HTS  Gait   Normal base and gait      mRS = 0  NIHSS = 0    Data Reviewed:  Diagnostic Results:        Brain/Vessel imaging:  MRI Brain w/o Contrast 2/28/24  Impression:  Small acute infarct in the medial right thalamus.  No significant surrounding edema or mass effect.  No acute intracranial hemorrhage.     CTA Stroke Multiphase 2/27/24  Normal CT of the head without with contrast. Normal CTA of the head and neck structures. Sinusitis change.     Cardiac Evaluation:   ECHO 2/28/24  Left Ventricle The left ventricle is normal in size. Ventricular mass is normal. Mildly increased wall thickness. There is concentric remodeling. Normal wall motion. There is normal systolic function with a visually estimated ejection fraction of 55 - 60%. There is normal diastolic function.   Right Ventricle Normal right ventricular cavity size. Systolic function is normal.   Left Atrium Normal left atrial size.   Right Atrium Normal right atrial size.   Aortic Valve The aortic valve is structurally normal. There is normal leaflet mobility. Aortic valve peak velocity is 1.03 m/s. Mean gradient is 3 mmHg.   Mitral Valve The mitral valve is structurally normal. There is normal leaflet mobility.   Tricuspid Valve The tricuspid valve is structurally normal. There is normal leaflet mobility.   Pulmonic Valve The pulmonic valve is structurally normal.   IVC/SVC IVC was not well visualized due to poor acoustic window.   Ascending Aorta Ascending aorta is normal  measuring 2.82 cm.         EKG 2/27/24 NSR    Lab Results   Component Value Date    LDLCALC 120.8 02/27/2024       Mauricio Mooney MD

## 2024-04-09 ENCOUNTER — OFFICE VISIT (OUTPATIENT)
Dept: NEUROLOGY | Facility: CLINIC | Age: 49
End: 2024-04-09
Payer: COMMERCIAL

## 2024-04-09 VITALS
WEIGHT: 226.19 LBS | DIASTOLIC BLOOD PRESSURE: 109 MMHG | SYSTOLIC BLOOD PRESSURE: 152 MMHG | HEART RATE: 82 BPM | BODY MASS INDEX: 32.38 KG/M2 | HEIGHT: 70 IN

## 2024-04-09 DIAGNOSIS — I10 PRIMARY HYPERTENSION: Chronic | ICD-10-CM

## 2024-04-09 DIAGNOSIS — F41.9 ANXIETY: ICD-10-CM

## 2024-04-09 DIAGNOSIS — Z72.0 TOBACCO USE: Chronic | ICD-10-CM

## 2024-04-09 DIAGNOSIS — E78.5 DYSLIPIDEMIA: ICD-10-CM

## 2024-04-09 DIAGNOSIS — Z86.73 HISTORY OF ISCHEMIC STROKE IN PRIOR THREE MONTHS: Primary | ICD-10-CM

## 2024-04-09 PROBLEM — Z92.82 S/P ADMN TPA IN DIFF FAC W/N LAST 24 HR BEF ADM TO CRNT FAC: Status: RESOLVED | Noted: 2024-02-27 | Resolved: 2024-04-09

## 2024-04-09 PROBLEM — I16.1 HYPERTENSIVE EMERGENCY: Status: RESOLVED | Noted: 2024-02-28 | Resolved: 2024-04-09

## 2024-04-09 PROBLEM — R53.1 ACUTE LEFT-SIDED WEAKNESS: Status: RESOLVED | Noted: 2024-02-27 | Resolved: 2024-04-09

## 2024-04-09 PROCEDURE — 99999 PR PBB SHADOW E&M-EST. PATIENT-LVL III: CPT | Mod: PBBFAC,,, | Performed by: PSYCHIATRY & NEUROLOGY

## 2024-04-09 PROCEDURE — 99215 OFFICE O/P EST HI 40 MIN: CPT | Mod: S$GLB,,, | Performed by: PSYCHIATRY & NEUROLOGY

## 2024-04-09 RX ORDER — LOSARTAN POTASSIUM 50 MG/1
50 TABLET ORAL DAILY
Qty: 30 TABLET | Refills: 0 | Status: SHIPPED | OUTPATIENT
Start: 2024-04-09 | End: 2024-05-09

## 2024-04-10 ENCOUNTER — CLINICAL SUPPORT (OUTPATIENT)
Dept: SMOKING CESSATION | Facility: CLINIC | Age: 49
End: 2024-04-10

## 2024-04-10 DIAGNOSIS — F17.200 NICOTINE DEPENDENCE: Primary | ICD-10-CM

## 2024-04-10 NOTE — PROGRESS NOTES
Individual Follow-Up Form    4/10/2024    Quit Date: 2/28/2024    Clinical Status of Patient: Outpatient    Length of Service: 30 minutes    Continuing Medication: yes  Nicotine gum    Other Medications: none     Target Symptoms: Withdrawal and medication side effects. The following were  rated moderate (3) to severe (4) on TCRS:  Moderate (3): none  Severe (4): none    Comments: Patient ws seen virtually today and reports that he remains tobacco free. He continues using nicotine gum that he purchases OTC, averaging about 1 piece of gum per day. Patient states that the doctor has mentioned that his blood pressure is still high even after being quit for some weeks. Encouraged patient to get back to working out as soon as possible and having some type of movement daily. Reviewed strategies, cues, and triggers. Introduced the negative impact of tobacco on health, the health advantages of discontinuing the use of tobacco, time line improved health changes after a quit, withdrawal issues to expect from nicotine and habit, and ways to achieve the goal of a quit. The patient will continue with  therapy sessions and medication monitoring by CTTS. Prescribed medication management will be by physician.     Diagnosis: F17.200    Next Visit: 5/8/2024

## 2024-05-08 ENCOUNTER — CLINICAL SUPPORT (OUTPATIENT)
Dept: SMOKING CESSATION | Facility: CLINIC | Age: 49
End: 2024-05-08

## 2024-05-08 DIAGNOSIS — F17.200 NICOTINE DEPENDENCE: Primary | ICD-10-CM

## 2024-05-08 NOTE — PROGRESS NOTES
Individual Follow-Up Form    5/8/2024    Quit Date: 2/28/2024    Clinical Status of Patient: Outpatient    Length of Service: 45 minutes    Continuing Medication: yes  Nicotine gum    Other Medications: none     Target Symptoms: Withdrawal and medication side effects. The following were  rated moderate (3) to severe (4) on TCRS:  Moderate (3): none  Severe (4): none    Comments: Patient was seen in clinic today and reports remaining tobacco free. He continues to use nicotine gum that he purchases OTC, averaging around 5 pieces per week. Patient reports that he is back in the gym to improve his cardiovascular system, stating that he has noticed that it's easier to get through a workout since he has stopped smoking. Patient also states that he has noticed lack of concentration in some instances since quitting, but has been using caffeine as a replacement. Reviewed strategies, controlling environment, cues, triggers, new goals set. Introduced high risk situations with preparation interventions, caffeine similarities with withdrawal issues of habit and nicotine, Alcohol, Understanding urges, cravings, stress and relaxation. Open discussion with intervention discussion. Patient is aware of CTTS contact information and understands that he can reach out as needed.     Diagnosis: F17.200    Next Visit:

## 2024-06-03 PROBLEM — I63.331 CEREBROVASCULAR ACCIDENT (CVA) DUE TO THROMBOSIS OF RIGHT POSTERIOR CEREBRAL ARTERY: Status: RESOLVED | Noted: 2024-02-28 | Resolved: 2024-06-03

## 2024-06-04 ENCOUNTER — TELEPHONE (OUTPATIENT)
Dept: INTERNAL MEDICINE | Facility: CLINIC | Age: 49
End: 2024-06-04
Payer: COMMERCIAL

## 2024-06-04 NOTE — TELEPHONE ENCOUNTER
----- Message from Vida Birch sent at 6/4/2024  9:30 AM CDT -----  Type:  Patient Returning Call    Who Called:     Who Left Message for Patient: rivas     Does the patient know what this is regarding?: missed call     Best Call Back Number:  [phone]    Additional Information:

## 2024-06-05 ENCOUNTER — PATIENT MESSAGE (OUTPATIENT)
Dept: NEUROLOGY | Facility: CLINIC | Age: 49
End: 2024-06-05
Payer: COMMERCIAL

## 2024-06-05 ENCOUNTER — LAB VISIT (OUTPATIENT)
Dept: LAB | Facility: OTHER | Age: 49
End: 2024-06-05
Attending: STUDENT IN AN ORGANIZED HEALTH CARE EDUCATION/TRAINING PROGRAM
Payer: COMMERCIAL

## 2024-06-05 DIAGNOSIS — R73.03 PREDIABETES: ICD-10-CM

## 2024-06-05 DIAGNOSIS — I63.331 CEREBROVASCULAR ACCIDENT (CVA) DUE TO THROMBOSIS OF RIGHT POSTERIOR CEREBRAL ARTERY: ICD-10-CM

## 2024-06-05 LAB
ALBUMIN SERPL BCP-MCNC: 3.9 G/DL (ref 3.5–5.2)
ALP SERPL-CCNC: 77 U/L (ref 55–135)
ALT SERPL W/O P-5'-P-CCNC: 42 U/L (ref 10–44)
ANION GAP SERPL CALC-SCNC: 9 MMOL/L (ref 8–16)
AST SERPL-CCNC: 22 U/L (ref 10–40)
BILIRUB SERPL-MCNC: 0.6 MG/DL (ref 0.1–1)
BUN SERPL-MCNC: 20 MG/DL (ref 6–20)
CALCIUM SERPL-MCNC: 9.5 MG/DL (ref 8.7–10.5)
CHLORIDE SERPL-SCNC: 107 MMOL/L (ref 95–110)
CHOLEST SERPL-MCNC: 222 MG/DL (ref 120–199)
CHOLEST/HDLC SERPL: 5.4 {RATIO} (ref 2–5)
CO2 SERPL-SCNC: 26 MMOL/L (ref 23–29)
CREAT SERPL-MCNC: 1.1 MG/DL (ref 0.5–1.4)
EST. GFR  (NO RACE VARIABLE): >60 ML/MIN/1.73 M^2
ESTIMATED AVG GLUCOSE: 140 MG/DL (ref 68–131)
GLUCOSE SERPL-MCNC: 133 MG/DL (ref 70–110)
HBA1C MFR BLD: 6.5 % (ref 4–5.6)
HDLC SERPL-MCNC: 41 MG/DL (ref 40–75)
HDLC SERPL: 18.5 % (ref 20–50)
LDLC SERPL CALC-MCNC: 127 MG/DL (ref 63–159)
NONHDLC SERPL-MCNC: 181 MG/DL
POTASSIUM SERPL-SCNC: 4 MMOL/L (ref 3.5–5.1)
PROT SERPL-MCNC: 6.9 G/DL (ref 6–8.4)
SODIUM SERPL-SCNC: 142 MMOL/L (ref 136–145)
TRIGL SERPL-MCNC: 270 MG/DL (ref 30–150)

## 2024-06-05 PROCEDURE — 80061 LIPID PANEL: CPT | Performed by: STUDENT IN AN ORGANIZED HEALTH CARE EDUCATION/TRAINING PROGRAM

## 2024-06-05 PROCEDURE — 36415 COLL VENOUS BLD VENIPUNCTURE: CPT | Performed by: STUDENT IN AN ORGANIZED HEALTH CARE EDUCATION/TRAINING PROGRAM

## 2024-06-05 PROCEDURE — 83036 HEMOGLOBIN GLYCOSYLATED A1C: CPT | Performed by: STUDENT IN AN ORGANIZED HEALTH CARE EDUCATION/TRAINING PROGRAM

## 2024-06-05 PROCEDURE — 80053 COMPREHEN METABOLIC PANEL: CPT | Performed by: STUDENT IN AN ORGANIZED HEALTH CARE EDUCATION/TRAINING PROGRAM

## 2024-06-07 ENCOUNTER — TELEPHONE (OUTPATIENT)
Dept: DIABETES | Facility: CLINIC | Age: 49
End: 2024-06-07
Payer: COMMERCIAL

## 2024-06-07 ENCOUNTER — OFFICE VISIT (OUTPATIENT)
Dept: INTERNAL MEDICINE | Facility: CLINIC | Age: 49
End: 2024-06-07
Payer: COMMERCIAL

## 2024-06-07 VITALS
DIASTOLIC BLOOD PRESSURE: 90 MMHG | SYSTOLIC BLOOD PRESSURE: 142 MMHG | BODY MASS INDEX: 32.79 KG/M2 | OXYGEN SATURATION: 96 % | HEART RATE: 74 BPM | WEIGHT: 229.06 LBS | HEIGHT: 70 IN

## 2024-06-07 DIAGNOSIS — Z86.73 HISTORY OF ISCHEMIC STROKE IN PRIOR THREE MONTHS: ICD-10-CM

## 2024-06-07 DIAGNOSIS — E11.9 TYPE 2 DIABETES MELLITUS WITHOUT COMPLICATION, WITHOUT LONG-TERM CURRENT USE OF INSULIN: Primary | ICD-10-CM

## 2024-06-07 PROCEDURE — 99214 OFFICE O/P EST MOD 30 MIN: CPT | Mod: S$GLB,,, | Performed by: STUDENT IN AN ORGANIZED HEALTH CARE EDUCATION/TRAINING PROGRAM

## 2024-06-07 PROCEDURE — 99999 PR PBB SHADOW E&M-EST. PATIENT-LVL IV: CPT | Mod: PBBFAC,,, | Performed by: STUDENT IN AN ORGANIZED HEALTH CARE EDUCATION/TRAINING PROGRAM

## 2024-06-07 RX ORDER — ASPIRIN 81 MG/1
81 TABLET ORAL DAILY
Qty: 90 TABLET | Refills: 3 | Status: SHIPPED | OUTPATIENT
Start: 2024-06-07 | End: 2025-06-07

## 2024-06-07 RX ORDER — METFORMIN HYDROCHLORIDE 500 MG/1
500 TABLET, EXTENDED RELEASE ORAL
Qty: 90 TABLET | Refills: 3 | Status: SHIPPED | OUTPATIENT
Start: 2024-06-07 | End: 2025-06-07

## 2024-06-07 NOTE — PROGRESS NOTES
"[facility]  Subjective:     Patient ID: Kishore Silverio is a 48 y.o. male.  Chief Complaint: Follow up Htn, CVA, New Onset T2DM.     HPI:  Patient is a 48 y.o. male who presents for the above chief complaint.     Taking any medications.  Was not aware of what he should be taking and fact that they were refills available.    Had labs done prior to this visit which show A1c is 6.5, moderately elevated total cholesterol with LDL above goal.     Patient continues to drink one soft drink per day and add sugar to his coffee.  Reports he eats a lot of white bread and red meat.     The otherwise tries to stay healthy with regular exercise and avoidance of alcohol.     His dizziness has resolved.      Current Outpatient Medications   Medication Instructions    albuterol (PROVENTIL/VENTOLIN HFA) 90 mcg/actuation inhaler 2 puffs, Inhalation, Every 6 hours PRN, Rescue    aspirin (ECOTRIN) 81 mg, Oral, Daily    atorvastatin (LIPITOR) 40 mg, Oral, Daily    losartan (COZAAR) 50 mg, Oral, Daily, Please follow up with your PCP regarding blood pressure management.    metFORMIN (GLUCOPHAGE-XR) 500 mg, Oral, With breakfast     Objective:      Body mass index is 32.87 kg/m².  Vitals:    06/07/24 0916 06/07/24 0940   BP: (!) 150/91 (!) 142/90   Pulse: 74    SpO2: 96%    Weight: 103.9 kg (229 lb 0.9 oz)    Height: 5' 10" (1.778 m)    PainSc: 0-No pain      Physical Exam  Constitutional:       Appearance: Normal appearance.   Cardiovascular:      Rate and Rhythm: Normal rate.      Heart sounds: No murmur heard.  Pulmonary:      Effort: Pulmonary effort is normal. No respiratory distress.      Breath sounds: No wheezing or rales.   Abdominal:      General: Abdomen is flat.   Skin:     General: Skin is warm and dry.   Neurological:      General: No focal deficit present.      Mental Status: He is alert.   Psychiatric:         Mood and Affect: Mood normal.           Assessment:       1. Type 2 diabetes mellitus without " complication, without long-term current use of insulin    2. History of ischemic stroke in prior three months        Plan:     Discussed diagnosis of new onset diabetes with patient.  Discussed need to avoid had a sugar in his diet.  Advised him to avoid drinking added sugar and eating white bread.  Refer to diabetes education.  Start metformin.   Patient to  and restart taking losartan, Lipitor, and aspirin for secondary prevention of stroke and treatment of hypertension.   Follow up in 3 months with A1c and lipids before.   Needs diabetic eye screening photo.  We will do foot exam at next visit.   Reviewed [facility] medication list.  Discussed A1c, LDL, and blood pressure goals.     Type 2 diabetes mellitus without complication, without long-term current use of insulin  -     Ambulatory referral/consult to Diabetes Education; Future; Expected date: 06/14/2024  -     metFORMIN (GLUCOPHAGE-XR) 500 MG ER 24hr tablet; Take 1 tablet (500 mg total) by mouth daily with breakfast.  Dispense: 90 tablet; Refill: 3  -     Hemoglobin A1C; Future; Expected date: 09/07/2024  -     LIPID PANEL; Future; Expected date: 09/07/2024  -     Diabetic Eye Screening Photo; Future    History of ischemic stroke in prior three months  -     aspirin (ECOTRIN) 81 MG EC tablet; Take 1 tablet (81 mg total) by mouth once daily.  Dispense: 90 tablet; Refill: 3  -     LIPID PANEL; Future; Expected date: 09/07/2024        Tests to Keep You Healthy    Colon Cancer Screening: ORDERED  Last Blood Pressure <= 139/89 (6/7/2024): NO    Follow up in about 3 months (around 9/7/2024). or sooner prn (as needed)          Frankie Ordaz  [facility]  [address]  [address]  [place] [address]  Phone [phone]  Fax [phone]    This note is dictated using the M*Modal Fluency Direct word recognition program. It may contain word recognition mistakes or wrong word substitutions (commonly he/she and is/was substitutions)  that were missed on review.

## 2024-06-10 ENCOUNTER — PATIENT MESSAGE (OUTPATIENT)
Dept: DIABETES | Facility: CLINIC | Age: 49
End: 2024-06-10

## 2024-06-10 ENCOUNTER — CLINICAL SUPPORT (OUTPATIENT)
Dept: DIABETES | Facility: CLINIC | Age: 49
End: 2024-06-10
Payer: COMMERCIAL

## 2024-06-10 VITALS — BODY MASS INDEX: 33.06 KG/M2 | HEIGHT: 70 IN | WEIGHT: 230.94 LBS

## 2024-06-10 DIAGNOSIS — E11.9 TYPE 2 DIABETES MELLITUS WITHOUT COMPLICATION, WITHOUT LONG-TERM CURRENT USE OF INSULIN: ICD-10-CM

## 2024-06-10 PROCEDURE — G0108 DIAB MANAGE TRN  PER INDIV: HCPCS | Mod: S$GLB,,, | Performed by: DIETITIAN, REGISTERED

## 2024-06-10 PROCEDURE — 99999 PR PBB SHADOW E&M-EST. PATIENT-LVL II: CPT | Mod: PBBFAC,,, | Performed by: DIETITIAN, REGISTERED

## 2024-06-11 NOTE — PROGRESS NOTES
"Diabetes Care Specialist Progress Note  Author: [place] RD, CDE  Date: 6/11/2024    Program Intake  Reason for Diabetes Program Visit:: Initial Diabetes Assessment  Current diabetes risk level:: low  In the last 12 months, have you:: been admitted to a hospital  Was the ER or hospital admission related to diabetes?: No (Stroke)  Continuous Glucose Monitoring  Patient has CGM: No    Lab Results   Component Value Date    HGBA1C 6.5 (H) 06/05/2024       Clinical    Weight: 104.8 kg (230 lb 14.9 oz)   Height: 5' 10" (177.8 cm)   Body mass index is 33.14 kg/m².    Problem Review  Reviewed Problem List with Patient: yes  Active comorbidities affecting diabetes self-care.: yes  Comorbidities: Stroke, Hypertension  Reviewed health maintenance: yes    Clinical Assessment  Current Diabetes Treatment: Oral Medication  Have you ever experienced hypoglycemia (low blood sugar)?: no  Have you ever experienced hyperglycemia (high blood sugar)?: no    Medication Information  How do you obtain your medications?: Patient drives  How many days a week do you miss your medications?:  (just started taking metformin today)  Do you sometimes have difficulty refilling your medications?: No    Current DM meds:  Metformin Xr 500mg daily with breakfast    Labs  Do you have regular lab work to monitor your medications?: Yes  Type of Regular Lab Work: A1c, Cholesterol, CBC  Where do you get your labs drawn?: [facility]  Lab Compliance Barriers: No    Nutritional Status  Meal Plan 24 Hour Recall: Breakfast, Lunch, Dinner, Snack  Meal Plan 24 Hour Recall - Breakfast: this morning oatmeal +protein powder + PB2, usually has pastry  Meal Plan 24 Hour Recall - Lunch: today plans to have salad, usually has 12" poboy  Meal Plan 24 Hour Recall - Dinner: wife cooks- protein/starch/vegetable meal  Meal Plan 24 Hour Recall - Snack: nuts/seeds - drinks: love regular sodas  Change in appetite?: No  Dentation:: Intact    Additional Social " History    Support  Does anyone support you with your diabetes care?: yes  Who supports you?: spouse, self  Who takes you to your medical appointments?: self  Does the current support meet the patient's needs?: Yes  Is Support an area impacting ability to self-manage diabetes?: No    Access to Mass Media & Technology  Does the patient have access to any of the following devices or technologies?: Smart phone, Internet Access, Home computer, Tablet  Media or technology needs impacting ability to self-manage diabetes?: No    Cognitive/Behavioral Health  Alert and Oriented: Yes  Difficulty Thinking: No  Requires Prompting: No  Requires assistance for routine expression?: No  Cognitive or behavioral barriers impacting ability to self-manage diabetes?: No    Culture/Confucianist  Culture or Quaker beliefs that may impact ability to access healthcare: No    Communication  Language preference: English  Hearing Problems: No  Vision Problems: No  Communication needs impacting ability to self-manage diabetes?: No    Health Literacy  Preferred Learning Method: Face to Face      Diabetes Self-Management Skills Assessment    Diabetes Disease Process/Treatment Options  Patient/caregiver able to state what happens when someone has diabetes.: somewhat  Patient/caregiver knows what type of diabetes they have.: yes  Diabetes Type : Type II  Patient/caregiver able to identify at least three signs and symptoms of diabetes.: no  Patient able to identify at least three risk factors for diabetes.: no  Diabetes Disease Process/Treatment Options: Skills Assessment Completed: Yes  Assessment indicates:: Instruction Needed  Area of need?: Yes    Nutrition/Healthy Eating  Challenges to healthy eating:: portion control  Method of carbohydrate measurement:: no method  Patient can identify foods that impact blood sugar.: yes  Patient-identified foods:: starches (bread, pasta, rice, cereal), fruit/fruit juice, soda, sweets  Nutrition/Healthy Eating  Skills Assessment Completed:: Yes  Assessment indicates:: Instruction Needed  Area of need?: Yes    Physical Activity/Exercise  Patient's daily activity level:: moderately active  Patient formally exercises outside of work.: yes  Exercise Type: running  Intensity: Moderate  Frequency: four or more times a week  Duration: 30 min  Patient can identify forms of physical activity.: yes  Stated forms of physical activity:: any movement performed by muscles that uses energy, moving to burn calories  Patient can identify reasons why exercise/physical activity is important in diabetes management.: yes  Identified reasons:: improves blood circulation, lowers blood glucose, blood pressure, and cholesterol, lowers risk of heart disease and stroke, strengthens heart, muscles, and bones, tones muscles, relieves stress, keeps body and joints flexible, helps insulin work better  Physical Activity/Exercise Skills Assessment Completed: : Yes  Assessment indicates:: Adequate understanding  Area of need?: No    Medications  Patient is able to describe current diabetes management routine.: yes  Diabetes management routine:: oral medications, exercise  Patient is able to identify current diabetes medications, dosages, and appropriate timing of medications.: yes  Patient understands the purpose of the medications taken for diabetes.: yes  Patient reports problems or concerns with current medication regimen.: no  Medication Skills Assessment Completed:: Yes  Assessment indicates:: Adequate understanding  Area of need?: No    Home Blood Glucose Monitoring  Patient states that blood sugar is checked at home daily.: no  Reasons for not monitoring:: new diabetes diagnosis  Home Blood Glucose Monitoring Skills Assessment Completed: : Yes  Assessment indicates:: Other (comment) (Pt prefers to defer SMBG at this time - willing to consider start testing if next A1C staying 6.5 or higher)  Area of need?: Deferred    Acute Complications  Acute  Complications Skills Assessment Completed: : No  Deffered due to:: Time  Area of need?: Deferred    Chronic Complications  Chronic Complications Skills Assessment Completed: : No  Deferred due to:: Time  Area of need?: Deferred    Psychosocial/Coping  Patient can identify ways of coping with chronic disease.: yes  Patient-stated ways of coping with chronic disease:: support from loved ones, counseling/actively seeing behavioral professional (has been to counseling in the past. Discussed effect of emotional stress on BG and BP - pt verbalized has understanding of strategies to cope with stress like meditation)  Psychosocial/Coping Skills Assessment Completed: : Yes  Assessment indicates:: Adequate understanding  Area of need?: No      Assessment Summary and Plan    Based on today's diabetes care assessment, the following areas of need were identified:          6/10/2024    12:01 AM   Social   Support No   Access to Mass Media/Tech No   Cognitive/Behavioral Health No   Culture/Uatsdin No   Communication No            6/10/2024    12:01 AM   Clinical   Lab Compliance No            6/10/2024    12:01 AM   Diabetes Self-Management Skills   Diabetes Disease Process/Treatment Options Yes -   Ed on what DM is, insulin resistance, beta cell burnout  Ed on importance of using lifestyle changes + appropriate medications to control BG and slow disease progression     Nutrition/Healthy Eating Yes - see care planning   Physical Activity/Exercise No   Medication No   Home Blood Glucose Monitoring Deferred   Acute Complications Deferred   Chronic Complications Deferred   Psychosocial/Coping No          Today's interventions were provided through individual discussion, instruction, and written materials were provided.      Patient verbalized understanding of instruction and written materials.  Pt was able to return back demonstration of instructions today. Patient understood key points, needs reinforcement and further instruction.  "    Diabetes Self-Management Care Plan:    Today's Diabetes Self-Management Care Plan was developed with Kishore's input. Kishore has agreed to work toward the following goal(s) to improve his/her overall diabetes control.      Care Plan: Diabetes Management   Updates made since 5/12/2024 12:00 AM        Problem: Healthy Eating         Goal: Pt will avoid sugary drinks.    Start Date: 6/10/2024   Expected End Date: 9/9/2024   Priority: High   Barriers: No Barriers Identified   Note:    6/10/24 - Pt has been drinking sugary drinks. Already knows and has started making the switch to non-carb drinks - mostly water. He also typically eats pastries for breakfast and eats out for lunch - usually a large sandwich. He has started making changes as of this morning. States he knows what he needs to do - went on a very restrictive (keto-type) diet in the past and lost significant weight. Explained I do not recommend completely avoiding carbs. Reviewed sources of carb, choosing more complex/high fiber carbs, and balancing meals with lean protein, non-starchy vegetables and smaller portion or high fiber carbs. Reviewed plate method and using measuring cups to control portion. Discussed whatever diet changes he makes, he will need to continue for life to keep the weight off. He expressed he feels strongly that he needs to be very regimented and restrictive citing that his personality is "all or nothing." States he plans to try it his way and will reconsider if it doesn't keep A1C down.        Task: Reviewed the sources and role of Carbohydrate, Protein, and Fat and how each nutrient impacts blood sugar. Completed 6/11/2024        Task: Provided visual examples using dry measuring cups, food models, and other familiar objects such as computer mouse, deck or cards, tennis ball etc. to help with visualization of portions. Completed 6/11/2024        Task: Explained how to count carbohydrates using the food label and the use of dry " measuring cups for accurate carb counting.         Task: Discussed strategies for choosing healthier menu options when dining out.         Task: Recommended replacing beverages containing high sugar content with noncaloric/sugar free options and/or water. Completed 6/11/2024        Task: Review the importance of balancing carbohydrates with each meal using portion control techniques to count servings of carbohydrate and label reading to identify serving size and amount of total carbs per serving. Completed 6/11/2024        Task: Provided Sample plate method and reviewed the use of the plate to estimate amounts of carbohydrate per meal. Completed 6/11/2024          Follow Up Plan     Follow up in about 3 months (around 9/11/2024) for 3 month follow-up.    Today's care plan and follow up schedule was discussed with patient.  Kishore verbalized understanding of the care plan, goals, and agrees to follow up plan.        The patient was encouraged to communicate with his/her health care provider/physician and care team regarding his/her condition(s) and treatment.  I provided the patient with my contact information today and encouraged to contact me via phone or [facility] Patient Portal as needed.     Length of Visit   Total Time: 60 Minutes

## 2024-06-21 ENCOUNTER — DOCUMENTATION ONLY (OUTPATIENT)
Dept: NEUROLOGY | Facility: HOSPITAL | Age: 49
End: 2024-06-21
Payer: COMMERCIAL

## 2024-07-02 ENCOUNTER — CLINICAL SUPPORT (OUTPATIENT)
Dept: SMOKING CESSATION | Facility: CLINIC | Age: 49
End: 2024-07-02

## 2024-07-02 DIAGNOSIS — F17.200 NICOTINE DEPENDENCE: Primary | ICD-10-CM

## 2024-07-02 PROCEDURE — 99999 PR PBB SHADOW E&M-EST. PATIENT-LVL I: CPT | Mod: PBBFAC,,,

## 2024-07-12 DIAGNOSIS — Z86.73 HISTORY OF ISCHEMIC STROKE IN PRIOR THREE MONTHS: ICD-10-CM

## 2024-07-12 DIAGNOSIS — I63.331 CEREBROVASCULAR ACCIDENT (CVA) DUE TO THROMBOSIS OF RIGHT POSTERIOR CEREBRAL ARTERY: ICD-10-CM

## 2024-07-12 NOTE — TELEPHONE ENCOUNTER
No care due was identified.  [facility] Embedded Care Due Messages. Reference number: [id number].   7/12/2024 3:02:45 PM CDT

## 2024-07-15 RX ORDER — ATORVASTATIN CALCIUM 40 MG/1
40 TABLET, FILM COATED ORAL DAILY
Qty: 90 TABLET | Refills: 2 | Status: SHIPPED | OUTPATIENT
Start: 2024-07-15 | End: 2025-04-11

## 2024-07-15 RX ORDER — ASPIRIN 81 MG/1
81 TABLET ORAL DAILY
Qty: 90 TABLET | Refills: 3 | Status: SHIPPED | OUTPATIENT
Start: 2024-07-15 | End: 2025-07-15

## 2024-09-10 ENCOUNTER — TELEPHONE (OUTPATIENT)
Dept: INTERNAL MEDICINE | Facility: CLINIC | Age: 49
End: 2024-09-10
Payer: COMMERCIAL

## 2024-10-23 ENCOUNTER — PATIENT MESSAGE (OUTPATIENT)
Dept: ADMINISTRATIVE | Facility: HOSPITAL | Age: 49
End: 2024-10-23
Payer: COMMERCIAL

## 2024-11-13 ENCOUNTER — PATIENT MESSAGE (OUTPATIENT)
Dept: ADMINISTRATIVE | Facility: HOSPITAL | Age: 49
End: 2024-11-13
Payer: COMMERCIAL

## 2024-12-11 ENCOUNTER — PATIENT OUTREACH (OUTPATIENT)
Dept: ADMINISTRATIVE | Facility: HOSPITAL | Age: 49
End: 2024-12-11
Payer: COMMERCIAL

## 2024-12-11 ENCOUNTER — PATIENT MESSAGE (OUTPATIENT)
Dept: ADMINISTRATIVE | Facility: HOSPITAL | Age: 49
End: 2024-12-11
Payer: COMMERCIAL

## 2025-03-12 ENCOUNTER — TELEPHONE (OUTPATIENT)
Dept: SMOKING CESSATION | Facility: CLINIC | Age: 50
End: 2025-03-12
Payer: COMMERCIAL

## 2025-03-27 ENCOUNTER — PATIENT MESSAGE (OUTPATIENT)
Dept: INTERNAL MEDICINE | Facility: CLINIC | Age: 50
End: 2025-03-27
Payer: COMMERCIAL

## 2025-03-28 ENCOUNTER — PATIENT MESSAGE (OUTPATIENT)
Dept: INTERNAL MEDICINE | Facility: CLINIC | Age: 50
End: 2025-03-28
Payer: COMMERCIAL

## 2025-03-28 ENCOUNTER — CLINICAL SUPPORT (OUTPATIENT)
Dept: SMOKING CESSATION | Facility: CLINIC | Age: 50
End: 2025-03-28

## 2025-03-28 DIAGNOSIS — F17.200 NICOTINE DEPENDENCE: Primary | ICD-10-CM

## 2025-03-28 PROCEDURE — 99999 PR PBB SHADOW E&M-EST. PATIENT-LVL I: CPT | Mod: PBBFAC,,,

## 2025-03-28 PROCEDURE — 99407 BEHAV CHNG SMOKING > 10 MIN: CPT | Mod: ,,, | Performed by: GENERAL PRACTICE

## 2025-03-28 NOTE — PROGRESS NOTES
Spoke with patient today in regard to smoking cessation progress 6/12 month telephone follow up, he states that he is tobacco free.  Commended patient on the accomplishments thus far.  Informed patient of benefit period, future follow up, and contact information if any further help or support is needed.  Will complete smart form for 6/12 month follow up on Quit attempt #1 and resolved episode.

## 2025-03-31 ENCOUNTER — PATIENT MESSAGE (OUTPATIENT)
Dept: ADMINISTRATIVE | Facility: HOSPITAL | Age: 50
End: 2025-03-31
Payer: COMMERCIAL

## 2025-05-06 ENCOUNTER — PATIENT OUTREACH (OUTPATIENT)
Dept: ADMINISTRATIVE | Facility: HOSPITAL | Age: 50
End: 2025-05-06
Payer: COMMERCIAL

## 2025-08-07 DIAGNOSIS — R73.03 PREDIABETES: ICD-10-CM
